# Patient Record
Sex: MALE | Race: WHITE | NOT HISPANIC OR LATINO | Employment: OTHER | URBAN - METROPOLITAN AREA
[De-identification: names, ages, dates, MRNs, and addresses within clinical notes are randomized per-mention and may not be internally consistent; named-entity substitution may affect disease eponyms.]

---

## 2017-02-17 ENCOUNTER — APPOINTMENT (OUTPATIENT)
Dept: CARDIOLOGY | Facility: HOSPITAL | Age: 77
End: 2017-02-17
Attending: INTERNAL MEDICINE

## 2017-02-17 ENCOUNTER — TRANSCRIBE ORDERS (OUTPATIENT)
Dept: ADMINISTRATIVE | Facility: HOSPITAL | Age: 77
End: 2017-02-17

## 2017-02-17 DIAGNOSIS — M79.604 PAIN OF RIGHT LOWER EXTREMITY: Primary | ICD-10-CM

## 2017-02-20 ENCOUNTER — HOSPITAL ENCOUNTER (OUTPATIENT)
Dept: CARDIOLOGY | Facility: HOSPITAL | Age: 77
Discharge: HOME | End: 2017-02-20
Attending: INTERNAL MEDICINE | Admitting: INTERNAL MEDICINE

## 2017-02-20 ENCOUNTER — TRANSCRIBE ORDERS (OUTPATIENT)
Dept: ADMINISTRATIVE | Facility: HOSPITAL | Age: 77
End: 2017-02-20

## 2017-02-20 ENCOUNTER — HOSPITAL ENCOUNTER (OUTPATIENT)
Dept: GENERAL RADIOLOGY | Facility: HOSPITAL | Age: 77
Discharge: HOME | End: 2017-02-20
Attending: INTERNAL MEDICINE

## 2017-02-20 DIAGNOSIS — M79.604 PAIN OF RIGHT LOWER EXTREMITY: ICD-10-CM

## 2017-02-20 DIAGNOSIS — M79.604 PAIN OF RIGHT LOWER EXTREMITY: Primary | ICD-10-CM

## 2017-02-20 LAB
BH CV LOW VAS RIGHT DISTAL FEMORAL SPONT: 1
BH CV LOW VAS RIGHT LESSER SAPH VESSEL: 1
BH CV LOW VAS RIGHT MID FEMORAL SPONT: 1
BH CV LOW VAS RIGHT POPLITEAL SPONT: 1
BH CV LOW VAS RIGHT POSTERIOR TIBIAL VESSEL: 1
BH CV LOWER VASCULAR LEFT COMMON FEMORAL AUGMENT: NORMAL
BH CV LOWER VASCULAR LEFT COMMON FEMORAL COMPETENT: NORMAL
BH CV LOWER VASCULAR LEFT COMMON FEMORAL COMPRESS: NORMAL
BH CV LOWER VASCULAR LEFT COMMON FEMORAL PHASIC: NORMAL
BH CV LOWER VASCULAR LEFT COMMON FEMORAL SPONT: NORMAL
BH CV LOWER VASCULAR RIGHT COMMON FEMORAL AUGMENT: NORMAL
BH CV LOWER VASCULAR RIGHT COMMON FEMORAL COMPETENT: NORMAL
BH CV LOWER VASCULAR RIGHT COMMON FEMORAL COMPRESS: NORMAL
BH CV LOWER VASCULAR RIGHT COMMON FEMORAL PHASIC: NORMAL
BH CV LOWER VASCULAR RIGHT COMMON FEMORAL SPONT: NORMAL
BH CV LOWER VASCULAR RIGHT DISTAL FEMORAL COMPRESS: NORMAL
BH CV LOWER VASCULAR RIGHT DISTAL FEMORAL THROMBUS: NORMAL
BH CV LOWER VASCULAR RIGHT GASTRONEMIUS COMPRESS: NORMAL
BH CV LOWER VASCULAR RIGHT GREATER SAPH AK COMPRESS: NORMAL
BH CV LOWER VASCULAR RIGHT GREATER SAPH BK COMPRESS: NORMAL
BH CV LOWER VASCULAR RIGHT LESSER SAPH COMPRESS: NORMAL
BH CV LOWER VASCULAR RIGHT LESSER SAPH THROMBUS: NORMAL
BH CV LOWER VASCULAR RIGHT MID FEMORAL AUGMENT: NORMAL
BH CV LOWER VASCULAR RIGHT MID FEMORAL COMPETENT: NORMAL
BH CV LOWER VASCULAR RIGHT MID FEMORAL COMPRESS: NORMAL
BH CV LOWER VASCULAR RIGHT MID FEMORAL PHASIC: NORMAL
BH CV LOWER VASCULAR RIGHT MID FEMORAL SPONT: NORMAL
BH CV LOWER VASCULAR RIGHT MID FEMORAL THROMBUS: NORMAL
BH CV LOWER VASCULAR RIGHT PERONEAL COMPRESS: NORMAL
BH CV LOWER VASCULAR RIGHT POPLITEAL AUGMENT: NORMAL
BH CV LOWER VASCULAR RIGHT POPLITEAL COMPETENT: NORMAL
BH CV LOWER VASCULAR RIGHT POPLITEAL COMPRESS: NORMAL
BH CV LOWER VASCULAR RIGHT POPLITEAL PHASIC: NORMAL
BH CV LOWER VASCULAR RIGHT POPLITEAL SPONT: NORMAL
BH CV LOWER VASCULAR RIGHT POPLITEAL THROMBUS: NORMAL
BH CV LOWER VASCULAR RIGHT POSTERIOR TIBIAL COMPRESS: NORMAL
BH CV LOWER VASCULAR RIGHT POSTERIOR TIBIAL THROMBUS: NORMAL
BH CV LOWER VASCULAR RIGHT PROXIMAL FEMORAL COMPRESS: NORMAL
BH CV LOWER VASCULAR RIGHT SAPHENOFEMORAL JUNCTION AUGMENT: NORMAL
BH CV LOWER VASCULAR RIGHT SAPHENOFEMORAL JUNCTION COMPETENT: NORMAL
BH CV LOWER VASCULAR RIGHT SAPHENOFEMORAL JUNCTION COMPRESS: NORMAL
BH CV LOWER VASCULAR RIGHT SAPHENOFEMORAL JUNCTION PHASIC: NORMAL
BH CV LOWER VASCULAR RIGHT SAPHENOFEMORAL JUNCTION SPONT: NORMAL

## 2017-02-20 PROCEDURE — 73560 X-RAY EXAM OF KNEE 1 OR 2: CPT

## 2017-02-20 PROCEDURE — 93971 EXTREMITY STUDY: CPT

## 2018-11-12 ENCOUNTER — LAB (OUTPATIENT)
Dept: LAB | Facility: HOSPITAL | Age: 78
End: 2018-11-12
Attending: INTERNAL MEDICINE

## 2018-11-12 ENCOUNTER — HOSPITAL ENCOUNTER (OUTPATIENT)
Dept: CARDIOLOGY | Facility: HOSPITAL | Age: 78
Discharge: HOME | End: 2018-11-12
Attending: INTERNAL MEDICINE | Admitting: INTERNAL MEDICINE

## 2018-11-12 ENCOUNTER — TRANSCRIBE ORDERS (OUTPATIENT)
Dept: ADMINISTRATIVE | Facility: HOSPITAL | Age: 78
End: 2018-11-12

## 2018-11-12 DIAGNOSIS — M79.602 PAIN AND SWELLING OF UPPER EXTREMITY, LEFT: Primary | ICD-10-CM

## 2018-11-12 DIAGNOSIS — I68.0: ICD-10-CM

## 2018-11-12 DIAGNOSIS — M79.89 PAIN AND SWELLING OF UPPER EXTREMITY, LEFT: ICD-10-CM

## 2018-11-12 DIAGNOSIS — M79.602 PAIN AND SWELLING OF UPPER EXTREMITY, LEFT: ICD-10-CM

## 2018-11-12 DIAGNOSIS — E85.4: ICD-10-CM

## 2018-11-12 DIAGNOSIS — M79.89 PAIN AND SWELLING OF UPPER EXTREMITY, LEFT: Primary | ICD-10-CM

## 2018-11-12 LAB
ALBUMIN SERPL-MCNC: 4.3 G/DL (ref 3.5–5.2)
ALBUMIN/GLOB SERPL: 1.4 G/DL
ALP SERPL-CCNC: 60 U/L (ref 39–117)
ALT SERPL W P-5'-P-CCNC: 17 U/L (ref 1–41)
ANION GAP SERPL CALCULATED.3IONS-SCNC: 10.8 MMOL/L
APTT PPP: 27.1 SECONDS (ref 22.7–35.4)
AST SERPL-CCNC: 18 U/L (ref 1–40)
BASOPHILS # BLD AUTO: 0.02 10*3/MM3 (ref 0–0.2)
BASOPHILS NFR BLD AUTO: 0.3 % (ref 0–1.5)
BH CV UPPER VENOUS LEFT AXILLARY AUGMENT: NORMAL
BH CV UPPER VENOUS LEFT AXILLARY COLOR: 1
BH CV UPPER VENOUS LEFT AXILLARY COMPETENT: NORMAL
BH CV UPPER VENOUS LEFT AXILLARY COMPRESS: NORMAL
BH CV UPPER VENOUS LEFT AXILLARY PHASIC: NORMAL
BH CV UPPER VENOUS LEFT AXILLARY SPONT: NORMAL
BH CV UPPER VENOUS LEFT AXILLARY THROMBUS: NORMAL
BH CV UPPER VENOUS LEFT BASILIC FOREARM COLOR: 1
BH CV UPPER VENOUS LEFT BASILIC FOREARM COMPRESS: NORMAL
BH CV UPPER VENOUS LEFT BASILIC FOREARM THROMBUS: NORMAL
BH CV UPPER VENOUS LEFT BASILIC UPPER COLOR: 1
BH CV UPPER VENOUS LEFT BASILIC UPPER COMPRESS: NORMAL
BH CV UPPER VENOUS LEFT BASILIC UPPER THROMBUS: NORMAL
BH CV UPPER VENOUS LEFT BRACHIAL COMPRESS: NORMAL
BH CV UPPER VENOUS LEFT CEPHALIC FOREARM COLOR: 1
BH CV UPPER VENOUS LEFT CEPHALIC FOREARM COMPRESS: NORMAL
BH CV UPPER VENOUS LEFT CEPHALIC FOREARM THROMBUS: NORMAL
BH CV UPPER VENOUS LEFT CEPHALIC UPPER COLOR: 1
BH CV UPPER VENOUS LEFT CEPHALIC UPPER COMPRESS: NORMAL
BH CV UPPER VENOUS LEFT CEPHALIC UPPER THROMBUS: NORMAL
BH CV UPPER VENOUS LEFT INTERNAL JUGULAR AUGMENT: NORMAL
BH CV UPPER VENOUS LEFT INTERNAL JUGULAR COMPETENT: NORMAL
BH CV UPPER VENOUS LEFT INTERNAL JUGULAR COMPRESS: NORMAL
BH CV UPPER VENOUS LEFT INTERNAL JUGULAR PHASIC: NORMAL
BH CV UPPER VENOUS LEFT INTERNAL JUGULAR SPONT: NORMAL
BH CV UPPER VENOUS LEFT RADIAL COMPRESS: NORMAL
BH CV UPPER VENOUS LEFT SUBCLAVIAN AUGMENT: NORMAL
BH CV UPPER VENOUS LEFT SUBCLAVIAN COLOR: 1
BH CV UPPER VENOUS LEFT SUBCLAVIAN COMPETENT: NORMAL
BH CV UPPER VENOUS LEFT SUBCLAVIAN COMPRESS: NORMAL
BH CV UPPER VENOUS LEFT SUBCLAVIAN PHASIC: NORMAL
BH CV UPPER VENOUS LEFT SUBCLAVIAN SPONT: NORMAL
BH CV UPPER VENOUS LEFT SUBCLAVIAN THROMBUS: NORMAL
BH CV UPPER VENOUS LEFT ULNAR COMPRESS: NORMAL
BH CV UPPER VENOUS RIGHT INTERNAL JUGULAR AUGMENT: NORMAL
BH CV UPPER VENOUS RIGHT INTERNAL JUGULAR COMPETENT: NORMAL
BH CV UPPER VENOUS RIGHT INTERNAL JUGULAR COMPRESS: NORMAL
BH CV UPPER VENOUS RIGHT INTERNAL JUGULAR PHASIC: NORMAL
BH CV UPPER VENOUS RIGHT INTERNAL JUGULAR SPONT: NORMAL
BH CV UPPER VENOUS RIGHT SUBCLAVIAN AUGMENT: NORMAL
BH CV UPPER VENOUS RIGHT SUBCLAVIAN COMPETENT: NORMAL
BH CV UPPER VENOUS RIGHT SUBCLAVIAN COMPRESS: NORMAL
BH CV UPPER VENOUS RIGHT SUBCLAVIAN PHASIC: NORMAL
BH CV UPPER VENOUS RIGHT SUBCLAVIAN SPONT: NORMAL
BILIRUB SERPL-MCNC: 0.5 MG/DL (ref 0.1–1.2)
BUN BLD-MCNC: 19 MG/DL (ref 8–23)
BUN/CREAT SERPL: 16.2 (ref 7–25)
CALCIUM SPEC-SCNC: 9.7 MG/DL (ref 8.6–10.5)
CHLORIDE SERPL-SCNC: 106 MMOL/L (ref 98–107)
CO2 SERPL-SCNC: 25.2 MMOL/L (ref 22–29)
CREAT BLD-MCNC: 1.17 MG/DL (ref 0.76–1.27)
DEPRECATED RDW RBC AUTO: 48.4 FL (ref 37–54)
EOSINOPHIL # BLD AUTO: 0.07 10*3/MM3 (ref 0–0.7)
EOSINOPHIL NFR BLD AUTO: 1.2 % (ref 0.3–6.2)
ERYTHROCYTE [DISTWIDTH] IN BLOOD BY AUTOMATED COUNT: 13.6 % (ref 11.5–14.5)
F5 GENE MUT ANL BLD/T: NORMAL
FACTOR II, DNA ANALYSIS: NORMAL
GFR SERPL CREATININE-BSD FRML MDRD: 60 ML/MIN/1.73
GLOBULIN UR ELPH-MCNC: 3 GM/DL
GLUCOSE BLD-MCNC: 100 MG/DL (ref 65–99)
HCT VFR BLD AUTO: 41.6 % (ref 40.4–52.2)
HGB BLD-MCNC: 14.1 G/DL (ref 13.7–17.6)
IMM GRANULOCYTES # BLD: 0.02 10*3/MM3 (ref 0–0.03)
IMM GRANULOCYTES NFR BLD: 0.3 % (ref 0–0.5)
INR PPP: 1.09 (ref 0.9–1.1)
LYMPHOCYTES # BLD AUTO: 1.47 10*3/MM3 (ref 0.9–4.8)
LYMPHOCYTES NFR BLD AUTO: 25.5 % (ref 19.6–45.3)
MCH RBC QN AUTO: 32.9 PG (ref 27–32.7)
MCHC RBC AUTO-ENTMCNC: 33.9 G/DL (ref 32.6–36.4)
MCV RBC AUTO: 97.2 FL (ref 79.8–96.2)
MONOCYTES # BLD AUTO: 0.39 10*3/MM3 (ref 0.2–1.2)
MONOCYTES NFR BLD AUTO: 6.8 % (ref 5–12)
NEUTROPHILS # BLD AUTO: 3.82 10*3/MM3 (ref 1.9–8.1)
NEUTROPHILS NFR BLD AUTO: 66.2 % (ref 42.7–76)
PLATELET # BLD AUTO: 164 10*3/MM3 (ref 140–500)
PMV BLD AUTO: 10.6 FL (ref 6–12)
POTASSIUM BLD-SCNC: 4.4 MMOL/L (ref 3.5–5.2)
PROT SERPL-MCNC: 7.3 G/DL (ref 6–8.5)
PROTHROMBIN TIME: 13.9 SECONDS (ref 11.7–14.2)
RBC # BLD AUTO: 4.28 10*6/MM3 (ref 4.6–6)
SODIUM BLD-SCNC: 142 MMOL/L (ref 136–145)
WBC NRBC COR # BLD: 5.77 10*3/MM3 (ref 4.5–10.7)

## 2018-11-12 PROCEDURE — 80053 COMPREHEN METABOLIC PANEL: CPT

## 2018-11-12 PROCEDURE — 85306 CLOT INHIBIT PROT S FREE: CPT

## 2018-11-12 PROCEDURE — 93971 EXTREMITY STUDY: CPT

## 2018-11-12 PROCEDURE — 85303 CLOT INHIBIT PROT C ACTIVITY: CPT

## 2018-11-12 PROCEDURE — 81240 F2 GENE: CPT

## 2018-11-12 PROCEDURE — 85610 PROTHROMBIN TIME: CPT

## 2018-11-12 PROCEDURE — 81241 F5 GENE: CPT

## 2018-11-12 PROCEDURE — 36415 COLL VENOUS BLD VENIPUNCTURE: CPT

## 2018-11-12 PROCEDURE — 85300 ANTITHROMBIN III ACTIVITY: CPT

## 2018-11-12 PROCEDURE — 85025 COMPLETE CBC W/AUTO DIFF WBC: CPT

## 2018-11-12 PROCEDURE — 85730 THROMBOPLASTIN TIME PARTIAL: CPT

## 2018-11-12 NOTE — PROGRESS NOTES
Left upper extremity venous doppler completed, Prelim positive for acute DVT given to Dr Smyth. Pt sent back to Dr isbell.

## 2018-11-13 ENCOUNTER — HOSPITAL ENCOUNTER (OUTPATIENT)
Dept: GENERAL RADIOLOGY | Facility: HOSPITAL | Age: 78
Discharge: HOME | End: 2018-11-13
Attending: INTERNAL MEDICINE | Admitting: INTERNAL MEDICINE

## 2018-11-13 DIAGNOSIS — I80.8 THROMBOPHLEBITIS ARM: ICD-10-CM

## 2018-11-13 LAB
AT III PPP CHRO-ACNC: 95 % (ref 90–134)
PROT C ACT/NOR PPP: 105 % (ref 86–163)
PROT S ACT/NOR PPP: 110 % (ref 70–127)
PROT S FREE PPP-ACNC: 94 % (ref 49–138)

## 2018-11-13 PROCEDURE — 71046 X-RAY EXAM CHEST 2 VIEWS: CPT

## 2020-07-16 ENCOUNTER — OFFICE VISIT (OUTPATIENT)
Dept: GASTROENTEROLOGY | Facility: CLINIC | Age: 80
End: 2020-07-16

## 2020-07-16 VITALS — TEMPERATURE: 97.8 F | BODY MASS INDEX: 26.63 KG/M2 | WEIGHT: 196.6 LBS | HEIGHT: 72 IN

## 2020-07-16 DIAGNOSIS — Z86.010 HISTORY OF ADENOMATOUS POLYP OF COLON: ICD-10-CM

## 2020-07-16 DIAGNOSIS — R19.5 OCCULT BLOOD IN STOOLS: Primary | ICD-10-CM

## 2020-07-16 PROBLEM — Z86.0101 HISTORY OF ADENOMATOUS POLYP OF COLON: Status: ACTIVE | Noted: 2020-07-16

## 2020-07-16 PROCEDURE — 99203 OFFICE O/P NEW LOW 30 MIN: CPT | Performed by: INTERNAL MEDICINE

## 2020-07-16 RX ORDER — RIVAROXABAN 10 MG/1
10 TABLET, FILM COATED ORAL DAILY
COMMUNITY
Start: 2020-05-05

## 2020-07-16 RX ORDER — UBIDECARENONE 75 MG
50 CAPSULE ORAL DAILY
COMMUNITY

## 2020-07-16 RX ORDER — MULTIVIT WITH MINERALS/LUTEIN
250 TABLET ORAL DAILY
COMMUNITY

## 2020-07-16 NOTE — PROGRESS NOTES
Chief Complaint   Patient presents with   • Positive hemoccult     Jayme Zaragoza is a 80 y.o. male who presents with a occult blood in the stool  HPI     Patient 80-year-old male with history of osteoarthritis presenting after occult blood found in the stool.  Patient does have a history of DVT currently on Xarelto doing well with no complaints.  Patient denies change in bowel habits no nausea vomiting no melena or bright red blood per rectum.  Patient on routine physical underwent rectal exam which tested positive for blood.  Labs at that time were normal now here for further recommendations.    Past Medical History:   Diagnosis Date   • Arthritis    • Cancer (CMS/HCC)    • History of transfusion        Current Outpatient Medications:   •  esomeprazole (NexIUM) 20 MG capsule, Take 20 mg by mouth every morning before breakfast., Disp: , Rfl:   •  Multiple Vitamins-Minerals (CENTRUM SILVER ADULT 50+ PO), Take 1 tablet by mouth daily., Disp: , Rfl:   •  sertraline (ZOLOFT) 25 MG tablet, Take 25 mg by mouth daily., Disp: , Rfl:   •  simvastatin (ZOCOR) 10 MG tablet, Take 10 mg by mouth every night., Disp: , Rfl:   •  vitamin B-12 (CYANOCOBALAMIN) 100 MCG tablet, Take 50 mcg by mouth Daily., Disp: , Rfl:   •  vitamin C (ASCORBIC ACID) 250 MG tablet, Take 250 mg by mouth Daily., Disp: , Rfl:   •  XARELTO 10 MG tablet, Take 10 mg by mouth Daily., Disp: , Rfl:   No Known Allergies  Social History     Socioeconomic History   • Marital status:      Spouse name: Not on file   • Number of children: Not on file   • Years of education: Not on file   • Highest education level: Not on file   Tobacco Use   • Smoking status: Former Smoker     Types: Cigars     Last attempt to quit: 1960     Years since quittin.2   • Smokeless tobacco: Never Used   Substance and Sexual Activity   • Alcohol use: Yes     Comment: 2-3 glasses of wine per day    • Drug use: No   • Sexual activity: Yes     Family History   Problem  Relation Age of Onset   • Arthritis Mother    • Cancer Mother    • Alcohol abuse Brother    • Depression Brother    • Diabetes Brother    • Hypertension Brother      Review of Systems   Constitutional: Negative.    HENT: Negative.    Eyes: Negative.    Respiratory: Negative.    Cardiovascular: Negative.    Gastrointestinal: Negative.    Endocrine: Negative.    Musculoskeletal: Positive for arthralgias. Negative for back pain, gait problem and joint swelling.   Skin: Negative.    Allergic/Immunologic: Negative.    Hematological: Negative.      Vitals:    07/16/20 1441   Temp: 97.8 °F (36.6 °C)     Physical Exam   Constitutional: He is oriented to person, place, and time. He appears well-developed and well-nourished.   HENT:   Head: Normocephalic and atraumatic.   Eyes: Pupils are equal, round, and reactive to light. Conjunctivae and EOM are normal. No scleral icterus.   Neck: Normal range of motion. No tracheal deviation present.   Cardiovascular: Normal rate and regular rhythm. Exam reveals no gallop and no friction rub.   No murmur heard.  Pulmonary/Chest: Effort normal and breath sounds normal. No respiratory distress. He has no wheezes. He has no rales.   Abdominal: Soft. Bowel sounds are normal. He exhibits no distension and no mass. There is no tenderness. There is no rebound and no guarding.   Musculoskeletal: Normal range of motion. He exhibits no edema.   Neurological: He is alert and oriented to person, place, and time. No cranial nerve deficit.   Skin: Skin is warm and dry. No rash noted.   Psychiatric: He has a normal mood and affect. Judgment normal.   Nursing note and vitals reviewed.    Diagnoses and all orders for this visit:    Occult blood in stools  -     Case Request; Standing  -     Implement Anesthesia orders day of procedure.; Standing  -     Obtain informed consent; Standing  -     Case Request    History of adenomatous polyp of colon  -     Case Request; Standing  -     Implement Anesthesia  orders day of procedure.; Standing  -     Obtain informed consent; Standing  -     Case Request    Other orders  -     XARELTO 10 MG tablet; Take 10 mg by mouth Daily.  -     vitamin B-12 (CYANOCOBALAMIN) 100 MCG tablet; Take 50 mcg by mouth Daily.  -     vitamin C (ASCORBIC ACID) 250 MG tablet; Take 250 mg by mouth Daily.    Patient 80-year-old male with past medical history significant for osteoarthritis presenting with a cold blood in the stool.  Patient last colonoscopy May 2016 has been doing well with no complaints.  Patient was noted on routine exam to be occult blood positive.  Labs have been otherwise normal here for further recommendations.  At this point would recommend with positive occult stools to proceed with colonoscopy now for over 4 years since last scope.  If negative okay to wait 5 to 6 years for next endoscopy.  We will follow-up clinically based on findings on endoscopy.

## 2020-07-22 ENCOUNTER — TRANSCRIBE ORDERS (OUTPATIENT)
Dept: SLEEP MEDICINE | Facility: HOSPITAL | Age: 80
End: 2020-07-22

## 2020-07-22 DIAGNOSIS — Z01.818 OTHER SPECIFIED PRE-OPERATIVE EXAMINATION: Primary | ICD-10-CM

## 2020-07-27 ENCOUNTER — LAB (OUTPATIENT)
Dept: LAB | Facility: HOSPITAL | Age: 80
End: 2020-07-27

## 2020-07-27 DIAGNOSIS — Z01.818 OTHER SPECIFIED PRE-OPERATIVE EXAMINATION: ICD-10-CM

## 2020-07-27 PROCEDURE — C9803 HOPD COVID-19 SPEC COLLECT: HCPCS

## 2020-07-27 PROCEDURE — U0004 COV-19 TEST NON-CDC HGH THRU: HCPCS

## 2020-07-28 LAB
REF LAB TEST METHOD: NORMAL
SARS-COV-2 RNA RESP QL NAA+PROBE: NOT DETECTED

## 2020-07-29 ENCOUNTER — HOSPITAL ENCOUNTER (OUTPATIENT)
Facility: HOSPITAL | Age: 80
Setting detail: HOSPITAL OUTPATIENT SURGERY
Discharge: HOME | End: 2020-07-29
Attending: INTERNAL MEDICINE | Admitting: INTERNAL MEDICINE

## 2020-07-29 ENCOUNTER — ANESTHESIA (OUTPATIENT)
Dept: GASTROENTEROLOGY | Facility: HOSPITAL | Age: 80
End: 2020-07-29

## 2020-07-29 ENCOUNTER — ANESTHESIA EVENT (OUTPATIENT)
Dept: GASTROENTEROLOGY | Facility: HOSPITAL | Age: 80
End: 2020-07-29

## 2020-07-29 VITALS
RESPIRATION RATE: 16 BRPM | TEMPERATURE: 98.1 F | HEIGHT: 73 IN | HEART RATE: 74 BPM | DIASTOLIC BLOOD PRESSURE: 84 MMHG | WEIGHT: 191 LBS | SYSTOLIC BLOOD PRESSURE: 128 MMHG | BODY MASS INDEX: 25.31 KG/M2 | OXYGEN SATURATION: 100 %

## 2020-07-29 DIAGNOSIS — R19.5 OCCULT BLOOD IN STOOLS: ICD-10-CM

## 2020-07-29 DIAGNOSIS — Z86.010 HISTORY OF ADENOMATOUS POLYP OF COLON: ICD-10-CM

## 2020-07-29 PROCEDURE — 88305 TISSUE EXAM BY PATHOLOGIST: CPT | Performed by: INTERNAL MEDICINE

## 2020-07-29 PROCEDURE — 25010000002 PROPOFOL 10 MG/ML EMULSION: Performed by: NURSE ANESTHETIST, CERTIFIED REGISTERED

## 2020-07-29 PROCEDURE — 45385 COLONOSCOPY W/LESION REMOVAL: CPT | Performed by: INTERNAL MEDICINE

## 2020-07-29 PROCEDURE — 25010000002 PHENYLEPHRINE PER 1 ML: Performed by: NURSE ANESTHETIST, CERTIFIED REGISTERED

## 2020-07-29 RX ORDER — PROPOFOL 10 MG/ML
VIAL (ML) INTRAVENOUS CONTINUOUS PRN
Status: DISCONTINUED | OUTPATIENT
Start: 2020-07-29 | End: 2020-07-29 | Stop reason: SURG

## 2020-07-29 RX ORDER — SODIUM CHLORIDE, SODIUM LACTATE, POTASSIUM CHLORIDE, CALCIUM CHLORIDE 600; 310; 30; 20 MG/100ML; MG/100ML; MG/100ML; MG/100ML
INJECTION, SOLUTION INTRAVENOUS CONTINUOUS PRN
Status: DISCONTINUED | OUTPATIENT
Start: 2020-07-29 | End: 2020-07-29 | Stop reason: SURG

## 2020-07-29 RX ORDER — GLYCOPYRROLATE 0.2 MG/ML
INJECTION INTRAMUSCULAR; INTRAVENOUS AS NEEDED
Status: DISCONTINUED | OUTPATIENT
Start: 2020-07-29 | End: 2020-07-29 | Stop reason: SURG

## 2020-07-29 RX ORDER — LIDOCAINE HYDROCHLORIDE 20 MG/ML
INJECTION, SOLUTION INFILTRATION; PERINEURAL AS NEEDED
Status: DISCONTINUED | OUTPATIENT
Start: 2020-07-29 | End: 2020-07-29 | Stop reason: SURG

## 2020-07-29 RX ORDER — SODIUM CHLORIDE, SODIUM LACTATE, POTASSIUM CHLORIDE, CALCIUM CHLORIDE 600; 310; 30; 20 MG/100ML; MG/100ML; MG/100ML; MG/100ML
30 INJECTION, SOLUTION INTRAVENOUS CONTINUOUS PRN
Status: DISCONTINUED | OUTPATIENT
Start: 2020-07-29 | End: 2020-07-29 | Stop reason: HOSPADM

## 2020-07-29 RX ORDER — PROPOFOL 10 MG/ML
VIAL (ML) INTRAVENOUS AS NEEDED
Status: DISCONTINUED | OUTPATIENT
Start: 2020-07-29 | End: 2020-07-29 | Stop reason: SURG

## 2020-07-29 RX ADMIN — PROPOFOL 70 MG: 10 INJECTION, EMULSION INTRAVENOUS at 10:18

## 2020-07-29 RX ADMIN — SODIUM CHLORIDE, POTASSIUM CHLORIDE, SODIUM LACTATE AND CALCIUM CHLORIDE: 600; 310; 30; 20 INJECTION, SOLUTION INTRAVENOUS at 10:15

## 2020-07-29 RX ADMIN — LIDOCAINE HYDROCHLORIDE 60 MG: 20 INJECTION, SOLUTION INFILTRATION; PERINEURAL at 10:18

## 2020-07-29 RX ADMIN — GLYCOPYRROLATE 200 MCG: 0.2 INJECTION INTRAMUSCULAR; INTRAVENOUS at 10:41

## 2020-07-29 RX ADMIN — PHENYLEPHRINE HYDROCHLORIDE 100 MCG: 10 INJECTION INTRAVENOUS at 10:38

## 2020-07-29 RX ADMIN — PROPOFOL 180 MCG/KG/MIN: 10 INJECTION, EMULSION INTRAVENOUS at 10:18

## 2020-07-29 RX ADMIN — SODIUM CHLORIDE, POTASSIUM CHLORIDE, SODIUM LACTATE AND CALCIUM CHLORIDE 30 ML/HR: 600; 310; 30; 20 INJECTION, SOLUTION INTRAVENOUS at 10:11

## 2020-07-29 NOTE — ANESTHESIA POSTPROCEDURE EVALUATION
"Patient: Jayme Zaragoza    Procedure Summary     Date:  07/29/20 Room / Location:   [facility] /  [facility]    Anesthesia Start:  1015 Anesthesia Stop:  1046    Procedure:  COLONOSCOPY to cecum and TI with hot snare polypectomy (N/A ) Diagnosis:       Occult blood in stools      History of adenomatous polyp of colon      Colon polyps      Internal hemorrhoids      (Occult blood in stools [R19.5])      (History of adenomatous polyp of colon [Z86.010])    Surgeon:  Zach Lerner MD Provider:  Markie Dee MD    Anesthesia Type:  MAC ASA Status:  3          Anesthesia Type: MAC    Vitals  Vitals Value Taken Time   /82 7/29/2020 10:56 AM   Temp     Pulse 71 7/29/2020 10:56 AM   Resp 16 7/29/2020 10:56 AM   SpO2 97 % 7/29/2020 10:56 AM           Post Anesthesia Care and Evaluation    Patient location during evaluation: bedside  Patient participation: complete - patient participated  Level of consciousness: awake  Pain score: 2  Pain management: adequate  Airway patency: patent  Anesthetic complications: No anesthetic complications  PONV Status: none  Cardiovascular status: acceptable  Respiratory status: acceptable  Hydration status: acceptable    Comments: /82 (BP Location: Left arm, Patient Position: Lying)   Pulse 71   Temp 36.7 °C (98.1 °F) (Oral)   Resp 16   Ht 184.2 cm (72.5\")   Wt 86.6 kg (191 lb)   SpO2 97%   BMI 25.55 kg/m²         "

## 2020-07-29 NOTE — ANESTHESIA PREPROCEDURE EVALUATION
Anesthesia Evaluation     Patient summary reviewed and Nursing notes reviewed   NPO Solid Status: > 8 hours  NPO Liquid Status: > 2 hours           Airway   Mallampati: II  TM distance: >3 FB  Neck ROM: full  Dental - normal exam     Pulmonary - normal exam   (+) a smoker Former,   Cardiovascular - normal exam    (+) DVT,       Neuro/Psych- negative ROS  GI/Hepatic/Renal/Endo    (+)  GI bleeding lower ,     Musculoskeletal     Abdominal    Substance History - negative use     OB/GYN negative ob/gyn ROS         Other   arthritis,    history of cancer                    Anesthesia Plan    ASA 3     MAC       Anesthetic plan, all risks, benefits, and alternatives have been provided, discussed and informed consent has been obtained with: patient.

## 2020-07-30 LAB
CYTO UR: NORMAL
LAB AP CASE REPORT: NORMAL
PATH REPORT.FINAL DX SPEC: NORMAL
PATH REPORT.GROSS SPEC: NORMAL

## 2020-08-17 ENCOUNTER — TELEPHONE (OUTPATIENT)
Dept: GASTROENTEROLOGY | Facility: CLINIC | Age: 80
End: 2020-08-17

## 2020-08-17 NOTE — TELEPHONE ENCOUNTER
----- Message from Lynette Rodriguez sent at 8/17/2020  9:25 AM EDT -----  Contact: [phone]  Patient is asking for results.  Please call.

## 2020-08-21 ENCOUNTER — HOSPITAL ENCOUNTER (EMERGENCY)
Facility: HOSPITAL | Age: 80
Discharge: HOME | End: 2020-08-21
Attending: EMERGENCY MEDICINE | Admitting: EMERGENCY MEDICINE

## 2020-08-21 ENCOUNTER — APPOINTMENT (OUTPATIENT)
Dept: GENERAL RADIOLOGY | Facility: HOSPITAL | Age: 80
End: 2020-08-21

## 2020-08-21 ENCOUNTER — APPOINTMENT (OUTPATIENT)
Dept: ULTRASOUND IMAGING | Facility: HOSPITAL | Age: 80
End: 2020-08-21

## 2020-08-21 VITALS
TEMPERATURE: 97.6 F | OXYGEN SATURATION: 97 % | WEIGHT: 192 LBS | SYSTOLIC BLOOD PRESSURE: 150 MMHG | HEIGHT: 72 IN | BODY MASS INDEX: 26.01 KG/M2 | RESPIRATION RATE: 18 BRPM | HEART RATE: 97 BPM | DIASTOLIC BLOOD PRESSURE: 80 MMHG

## 2020-08-21 DIAGNOSIS — M54.41 ACUTE RIGHT-SIDED LOW BACK PAIN WITH RIGHT-SIDED SCIATICA: ICD-10-CM

## 2020-08-21 DIAGNOSIS — M19.90 ARTHRITIS: ICD-10-CM

## 2020-08-21 DIAGNOSIS — M47.9 SPONDYLOSIS: Primary | ICD-10-CM

## 2020-08-21 LAB
BILIRUB UR QL STRIP: NEGATIVE
CLARITY UR: CLEAR
COLOR UR: YELLOW
GLUCOSE UR STRIP-MCNC: NEGATIVE MG/DL
HGB UR QL STRIP.AUTO: NEGATIVE
KETONES UR QL STRIP: NEGATIVE
LEUKOCYTE ESTERASE UR QL STRIP.AUTO: NEGATIVE
NITRITE UR QL STRIP: NEGATIVE
PH UR STRIP.AUTO: 6 [PH] (ref 5–8)
PROT UR QL STRIP: NEGATIVE
SP GR UR STRIP: 1.02 (ref 1–1.03)
UROBILINOGEN UR QL STRIP: NORMAL

## 2020-08-21 PROCEDURE — 72110 X-RAY EXAM L-2 SPINE 4/>VWS: CPT

## 2020-08-21 PROCEDURE — 99283 EMERGENCY DEPT VISIT LOW MDM: CPT

## 2020-08-21 PROCEDURE — 81003 URINALYSIS AUTO W/O SCOPE: CPT | Performed by: PHYSICIAN ASSISTANT

## 2020-08-21 PROCEDURE — 93976 VASCULAR STUDY: CPT

## 2020-08-21 PROCEDURE — 76870 US EXAM SCROTUM: CPT

## 2020-08-21 RX ORDER — METHYLPREDNISOLONE 4 MG/1
TABLET ORAL
Qty: 21 TABLET | Refills: 0 | Status: SHIPPED | OUTPATIENT
Start: 2020-08-21 | End: 2020-09-29

## 2020-08-21 RX ORDER — HYDROCODONE BITARTRATE AND ACETAMINOPHEN 5; 325 MG/1; MG/1
1 TABLET ORAL EVERY 6 HOURS PRN
Status: DISCONTINUED | OUTPATIENT
Start: 2020-08-21 | End: 2020-08-21 | Stop reason: HOSPADM

## 2020-08-21 RX ORDER — CYCLOBENZAPRINE HCL 10 MG
10 TABLET ORAL ONCE
Status: COMPLETED | OUTPATIENT
Start: 2020-08-21 | End: 2020-08-21

## 2020-08-21 RX ADMIN — HYDROCODONE BITARTRATE AND ACETAMINOPHEN 1 TABLET: 5; 325 TABLET ORAL at 10:59

## 2020-08-21 RX ADMIN — CYCLOBENZAPRINE 10 MG: 10 TABLET, FILM COATED ORAL at 11:00

## 2020-08-21 NOTE — ED NOTES
Pt comes to the ED c/o pain in his lower back that radiates through his right flank towards his right testicle. Pt also complains of swelling to his right testicle. Pt denies numbness or tingling to his lower extremities. Pt reports history of DVT in the right lower extremity. Pt reports that he took a hydrocodone last evening to help with the pain.      Arnoldo Whitehead RN  08/21/20 1034       Arnoldo Whitehead RN  08/21/20 1037

## 2020-08-21 NOTE — ED PROVIDER NOTES
EMERGENCY DEPARTMENT ENCOUNTER    Room Number:  36/36  Date seen:  8/21/2020  Time seen: 10:36 AM  PCP: Srinivasan Smyth Jr., MD  Historian: Patient    HPI:  Chief complaint: Back pain  A complete HPI/ROS/PMH/PSH/SH/FH are unobtainable due to: None  Context:Jayme Zaragoza is a 80 y.o. male, hx of DVT, takes Xarelto, who presents to the ED with c/o right low back pain with intermittent sciatica down his right lower extremity and radiation to his right testicle since yesterday afternoon.  Patient denies any recent injury to the area.  Denies urinary or bowel incontinence, hematuria, dysuria, urinary frequency or urgency.  Took 2 extra strength Tylenol at 3 AM with some relief.    In addition, patient is complaining of right testicular pain and swelling since yesterday.    Patient was placed in face mask in first look. Patient was wearing facemask when I entered the room and throughout our encounter. I wore full protective equipment throughout this patient encounter including a face mask, and gloves. Hand hygiene was performed before donning protective equipment and after removal when leaving the room.    MEDICAL RECORD REVIEW  Patient was last seen here May 2016 for right lower extremity DVT.    ALLERGIES  Patient has no known allergies.    PAST MEDICAL HISTORY  Active Ambulatory Problems     Diagnosis Date Noted   • DVT (deep venous thrombosis) (CMS/HCC) 05/16/2016   • Occult blood in stools 07/16/2020   • History of adenomatous polyp of colon 07/16/2020     Resolved Ambulatory Problems     Diagnosis Date Noted   • No Resolved Ambulatory Problems     Past Medical History:   Diagnosis Date   • Anxiety    • Arthritis    • Cancer (CMS/HCC)    • History of transfusion    • Hyperlipidemia        PAST SURGICAL HISTORY  Past Surgical History:   Procedure Laterality Date   • APPENDECTOMY     • COLONOSCOPY  approx 2016    negative per pt    • COLONOSCOPY N/A 7/29/2020    Procedure: COLONOSCOPY to cecum and TI with hot  snare polypectomy;  Surgeon: Zach Lerner MD;  Location: [facility] END[facility];  Service: Gastroenterology;  Laterality: N/A;  pre: occult blood in stool  Post: polyp, hemorrhoids   • EYE SURGERY Bilateral     Cataracks   • SKIN BIOPSY     • SKIN BIOPSY     • TONSILLECTOMY         FAMILY HISTORY  Family History   Problem Relation Age of Onset   • Arthritis Mother    • Cancer Mother    • Alcohol abuse Brother    • Depression Brother    • Diabetes Brother    • Hypertension Brother        SOCIAL HISTORY  Social History     Socioeconomic History   • Marital status:      Spouse name: Not on file   • Number of children: Not on file   • Years of education: Not on file   • Highest education level: Not on file   Tobacco Use   • Smoking status: Former Smoker     Types: Cigars     Last attempt to quit: 1960     Years since quittin.3   • Smokeless tobacco: Never Used   Substance and Sexual Activity   • Alcohol use: Yes     Comment: 2-3 glasses of wine per day    • Drug use: No   • Sexual activity: Yes       REVIEW OF SYSTEMS  Review of Systems    All systems reviewed and negative except for those discussed in HPI.     PHYSICAL EXAM    ED Triage Vitals [20 0955]   Temp Heart Rate Resp BP SpO2   97.6 °F (36.4 °C) 97 18 -- 97 %      Temp src Heart Rate Source Patient Position BP Location FiO2 (%)   Tympanic Monitor -- -- --     Physical Exam    I have reviewed the triage vital signs and nursing notes.      GENERAL: not distressed  HENT: nares patent  EYES: no scleral icterus  NECK: no ROM limitations  CV: regular rhythm, regular rate  RESPIRATORY: normal effort; clear to auscultation bilaterally  : Mild swelling and tenderness to palpation to his right testicular area  MUSCULOSKELETAL: no deformity; tenderness to palpation to his right lumbar area, positive right straight leg raise test  NEURO: alert, moves all extremities, follows commands, no saddle anesthesia  SKIN: warm, dry    LAB RESULTS  Recent  Results (from the past 24 hour(s))   Urinalysis With Microscopic If Indicated (No Culture) - Urine, Clean Catch    Collection Time: 08/21/20 11:45 AM   Result Value Ref Range    Color, UA Yellow Yellow, Straw    Appearance, UA Clear Clear    pH, UA 6.0 5.0 - 8.0    Specific Gravity, UA 1.017 1.005 - 1.030    Glucose, UA Negative Negative    Ketones, UA Negative Negative    Bilirubin, UA Negative Negative    Blood, UA Negative Negative    Protein, UA Negative Negative    Leuk Esterase, UA Negative Negative    Nitrite, UA Negative Negative    Urobilinogen, UA 0.2 E.U./dL 0.2 - 1.0 E.U./dL         RADIOLOGY RESULTS  US Scrotum & Testicles   Final Result   No acute abnormality is seen. Please follow up as clinically   indicated.       This report was finalized on 8/21/2020 3:49 PM by Dr. Sudha Mcdonald M.D.          XR Spine Lumbar Complete 4+VW   Final Result      US Testicular or Ovarian Vascular Limited    (Results Pending)         PROGRESS, DATA ANALYSIS, CONSULTS AND MEDICAL DECISION MAKING  All labs have been independently reviewed by me.  All radiology studies have been reviewed by me and discussed with radiologist dictating the report. Discussion below represents my analysis of pertinent findings related to patient's condition, differential diagnosis, treatment plan and final disposition.     ED Course as of Aug 21 2102   Fri Aug 21, 2020   1108 Testicular exam chaperoned by BRETT Mclaughlin RN.    [SS]      ED Course User Index  [SS] Crystal Parada PA-C       The differential diagnosis include but are not limited to lumbar fracture, herniated disc, cauda equina syndrome, epididymitis.         Reviewed pt's history and workup with Dr. Escobedo.  After a bedside evaluation, Dr. Escobedo agrees with the plan of care.    (FOR DISCHARGE)The patient's history, physical exam, and lab findings were discussed with the physician, who also performed a face to face history and physical exam.  I discussed all results and noted any  "abnormalities with patient.  Discussed absoute need to recheck abnormalities with their family physician.  I answered any of the patient's questions.  Discussed plan for discharge, as there is no emergent indication for admission.  Pt is agreeable and understands need for follow up and repeat testing.  Pt is aware that discharge does not mean that nothing is wrong but it indicates no emergency is present and they must continue care with their family physician.  Pt is discharged with instructions to follow up with primary care doctor to have their blood pressure rechecked.         Disposition vitals:  /80 (BP Location: Left arm, Patient Position: Sitting)   Pulse 97   Temp 97.6 °F (36.4 °C) (Tympanic)   Resp 18   Ht 182.9 cm (72\")   Wt 87.1 kg (192 lb)   SpO2 97%   BMI 26.04 kg/m²       DIAGNOSIS  Final diagnoses:   Spondylosis   Arthritis   Acute right-sided low back pain with right-sided sciatica       FOLLOW UP   Srinivasan Smyth Jr., MD  [address] [facility] [address]  [place] [address]  [phone]          Jhony Norwood MD  [address] [facility] [address]  [place] [address]  [phone]    Call   If the back pain continues         Crystal Parada PA-C  08/21/20 2102    "

## 2020-08-21 NOTE — DISCHARGE INSTRUCTIONS
Please take prescription as prescribed.  Call Dr. Norwood, neurosurgery if the back pain continues.

## 2020-08-21 NOTE — ED PROVIDER NOTES
MD ATTESTATION NOTE    The RASHEEDA and I have discussed this patient's history, physical exam, and treatment plan.  I have reviewed the documentation and personally had a face to face interaction with the patient. I affirm the documentation and agree with the treatment and plan.  The attached note describes my personal findings.      History  80-year-old male presents with lower back pain rating to the right lower extremity.  No injury.  No significant weakness numbness or incontinence.  Patient is anticoagulated on Xarelto.    Physical Exam  Vital Signs reviewed  Alert, Well Appearing in NAD  Back-mild diffuse lumbar tenderness without focal point tenderness.  Neuro-strength sensation are grossly intact in bilateral lower extremities.    Disposition  I discussed evaluation treatment with JOHN Parada.  Testicular ultrasound done due to right testicular swelling was unremarkable.  Physical examination is unremarkable without significant red flags.  I do suspect mild degree of lumbar radiculopathy and expect conservative treatment.  We are still waiting at this time for results of plain films although I expect to find mild arthritic changes.     Pj Escobedo MD  08/21/20 4417

## 2020-08-21 NOTE — ED TRIAGE NOTES
Pt complains of low back pain that radiates down his right leg that started yesterday afternoon. Called his doctor and was told to come to the ER. Pt denies any known injury. Pt masked at arrival and triage staff wore all appropriate PPE.

## 2020-08-21 NOTE — ED NOTES
This RN in appropriate PPE for all patient care interactions. Pt masked and redirected for proper mask use when necessary. Hand hygiene performed before and after all patient care interactions.       Arnoldo Whitehead, RN  08/21/20 6835

## 2020-08-25 ENCOUNTER — TRANSCRIBE ORDERS (OUTPATIENT)
Dept: ADMINISTRATIVE | Facility: HOSPITAL | Age: 80
End: 2020-08-25

## 2020-08-25 DIAGNOSIS — M54.16 LUMBAR RADICULOPATHY: Primary | ICD-10-CM

## 2020-09-08 ENCOUNTER — HOSPITAL ENCOUNTER (OUTPATIENT)
Dept: MRI IMAGING | Facility: HOSPITAL | Age: 80
Discharge: HOME | End: 2020-09-08
Admitting: INTERNAL MEDICINE

## 2020-09-08 DIAGNOSIS — M54.16 LUMBAR RADICULOPATHY: ICD-10-CM

## 2020-09-08 LAB — CREAT BLDA-MCNC: 1.3 MG/DL (ref 0.6–1.3)

## 2020-09-08 PROCEDURE — 82565 ASSAY OF CREATININE: CPT

## 2020-09-08 PROCEDURE — 0 GADOBENATE DIMEGLUMINE 529 MG/ML SOLUTION: Performed by: INTERNAL MEDICINE

## 2020-09-08 PROCEDURE — A9577 INJ MULTIHANCE: HCPCS | Performed by: INTERNAL MEDICINE

## 2020-09-08 PROCEDURE — 72158 MRI LUMBAR SPINE W/O & W/DYE: CPT

## 2020-09-08 RX ADMIN — GADOBENATE DIMEGLUMINE 18 ML: 529 INJECTION, SOLUTION INTRAVENOUS at 10:32

## 2020-09-17 ENCOUNTER — TELEPHONE (OUTPATIENT)
Dept: GASTROENTEROLOGY | Facility: CLINIC | Age: 80
End: 2020-09-17

## 2020-09-17 NOTE — TELEPHONE ENCOUNTER
----- Message from Zach Lerner MD sent at 9/7/2020  4:01 PM EDT -----  Polyp benign, repeat colon 5 years as discussed

## 2020-09-21 NOTE — PROGRESS NOTES
Subjective   History of Present Illness: Jayme Zaragoza is a 80 y.o. male is being seen for consultation today at the request of Srinivasan Smyth Jr.,*. He reports that on August 8, 2020 he was playing golf and that is when he thinks he hurt his back.  Actually had pretty significant right-sided back pain into the right thigh and right groin.  Even sought care through the emergency room because of the pain.  He reports that within the last several day his back pain and right leg pain has improved and nearly resolved. He denies numbness, tingling and weakness. He doesn't have back pain. He has very little right leg pain. He was given oral steroids as primary physician.     In reference to an incidental finding seen on his lumbar MRI I inquired whether he ever had a spinal tap as a child and he does not think he ever did.  He is also never had any significant spinal or lower extremity issue and was an avid sportsman most of his life    While in the room and during my examination of the patient I wore a mask and eye protection.  I washed my hands before and after this patient encounter.  The patient was also wearing a mask.    Leg Pain   The pain is present in the right leg, right hip, right thigh, right knee, right ankle, right heel, right foot and right toes. Quality: soreness. The pain is at a severity of 1/10. The pain is mild. The pain has been intermittent since onset. Pertinent negatives include no muscle weakness, numbness or tingling. Treatments tried: Oral steroids.       The following portions of the patient's history were reviewed and updated as appropriate: allergies, current medications, past family history, past medical history, past social history, past surgical history and problem list.    Review of Systems   Constitutional: Negative.  Negative for activity change and fever.   HENT: Negative.  Negative for congestion.    Eyes: Negative.  Negative for visual disturbance.   Respiratory: Negative.   "Negative for chest tightness and shortness of breath.    Cardiovascular: Negative.  Negative for chest pain.   Gastrointestinal: Negative.  Negative for diarrhea, nausea and vomiting.   Endocrine: Negative.  Negative for cold intolerance and heat intolerance.   Genitourinary: Negative.  Negative for difficulty urinating.   Musculoskeletal: Negative.  Negative for back pain.   Skin: Negative.  Negative for rash.   Allergic/Immunologic: Negative.  Negative for environmental allergies.   Neurological: Negative.  Negative for tingling and numbness.   Hematological: Negative.  Does not bruise/bleed easily.   Psychiatric/Behavioral: Negative.  Negative for sleep disturbance.       Objective     Vitals:    09/29/20 0951   BP: 147/87   Pulse: 72   Temp: 97.5 °F (36.4 °C)   Weight: 87.1 kg (192 lb)   Height: 182.9 cm (72\")     Body mass index is 26.04 kg/m².      Physical Exam  Neurologic Exam    Physical Exam:    CONSTITUTIONAL: This 80 year old right handed  male appears well developed, well-nourished and in no acute distress.    HEAD & FACE: the head and face are symmetric, normocephalic and atraumatic.    EYES: Inspection of the conjunctivae and lids reveals no swelling, erythema or discharge.  Pupils are round, equal and reactive to light and there is no scleral icterus.    EARS, NOSE, MOUTH & THROAT: On inspection, the ears and nose are within normal limits.    NECK: the neck is supple and symmetric. The trachea is midline with no masses.    PULMONARY: Respiratory effort is normal with no increased work of breathing or signs of respiratory distress.    CARDIOVASCULAR: Pedal pulses are +1/4 bilaterally. Examination of the extremities shows no edema or varicosities.    LYMPHATIC: There is no palpable lymphadenopathy of the neck.    MUSCULOSKELETAL: Gait and station are within normal limits. The spine has normal alignment and range of motion.    SKIN: The skin is warm, dry and intact    NEUROLOGIC:   Cranial " Nerves 2-12 intact  Normal motor strength noted. Muscle bulk and tone are normal.  Sensory exam is normal to fine touch to confrontational testing bilaterally  Reflexes on the right side demonstrates 1/4 Knee Jerk Reflex, 0/4 Ankle Jerk Reflex and no ankle clonus on the right.   Reflexes on the left side demonstrates  1/4 Knee Jerk Reflex, 0/4 Ankle Jerk Reflex and no ankle clonus on the left.  Superficial/Primitive Reflexes: primitive reflexes were absent.  Hurst's, Babinski, and Clonus signs all negative.  No coordination deficit observed.  Radicular testing showed a negative Michael (TAD) test and negative straight leg raise.  Cortical function is intact and without deficits. Speech is normal.    PSYCHIATRIC: oriented to person, place and time. Patient's mood and affect are normal.    Assessment/Plan   Independent Review of Radiographic Studies:      I personally reviewed the images from the following studies.    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  This is compared to a CT scan of the abdomen pelvis done on Carol 3, 2016 and is felt to be similar in size.     More importantly relating to his symptomatology there is a right foraminal disc herniation at L3-4 that could cause right L3 nerve root compression.    Medical Decision Making:      It does appear he suffered the golfing injury with a disc herniation to the right at L3-4 contacting the L3 nerve root.  The symptoms have resolved by the time he got into see me as most patients experience.  80% of patients with disc herniation do not go on to require surgery with conservative management.  Think is reasonable for him to get back to golf that he do a short course of physical therapy 2-3 times a week for 3 weeks.  We will see  him back upon completion of physical therapy to make sure his symptoms resolve completely.    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  There is no record that he ever had a spinal tap to suggest this could be a dermoid cyst but certainly with evidence that this was there in 2016 and he is completely asymptomatic from it it should not require any neurosurgical follow-up or observation.    Return in about 4 weeks (around 10/27/2020) for discussion of Physical Therapy results.    Jayme was seen today for back pain.    Diagnoses and all orders for this visit:    Lumbar disc herniation  -     Ambulatory Referral to Physical Therapy Evaluate and treat    Fibrolipoma of filum terminale             Jayme Navarro MD FACS [facility]  Neurological Surgery

## 2020-09-25 ENCOUNTER — TELEPHONE (OUTPATIENT)
Dept: NEUROSURGERY | Facility: CLINIC | Age: 80
End: 2020-09-25

## 2020-09-25 NOTE — TELEPHONE ENCOUNTER
[facility] for patient to see if he wanted to come in 09/28/20 to see Dr. Navarro instead. Thx.

## 2020-09-29 ENCOUNTER — OFFICE VISIT (OUTPATIENT)
Dept: NEUROSURGERY | Facility: CLINIC | Age: 80
End: 2020-09-29

## 2020-09-29 VITALS
DIASTOLIC BLOOD PRESSURE: 87 MMHG | SYSTOLIC BLOOD PRESSURE: 147 MMHG | HEIGHT: 72 IN | HEART RATE: 72 BPM | BODY MASS INDEX: 26.01 KG/M2 | WEIGHT: 192 LBS | TEMPERATURE: 97.5 F

## 2020-09-29 DIAGNOSIS — D17.79 FIBROLIPOMA OF FILUM TERMINALE: ICD-10-CM

## 2020-09-29 DIAGNOSIS — M51.26 LUMBAR DISC HERNIATION: Primary | ICD-10-CM

## 2020-09-29 PROCEDURE — 99204 OFFICE O/P NEW MOD 45 MIN: CPT | Performed by: NEUROLOGICAL SURGERY

## 2020-11-03 ENCOUNTER — OFFICE VISIT (OUTPATIENT)
Dept: NEUROSURGERY | Facility: CLINIC | Age: 80
End: 2020-11-03

## 2020-11-03 VITALS
SYSTOLIC BLOOD PRESSURE: 120 MMHG | DIASTOLIC BLOOD PRESSURE: 80 MMHG | BODY MASS INDEX: 26.01 KG/M2 | HEIGHT: 72 IN | TEMPERATURE: 97.5 F | WEIGHT: 192 LBS

## 2020-11-03 DIAGNOSIS — D17.79 FIBROLIPOMA OF FILUM TERMINALE: ICD-10-CM

## 2020-11-03 DIAGNOSIS — M51.26 LUMBAR DISC HERNIATION: Primary | ICD-10-CM

## 2020-11-03 PROCEDURE — 99213 OFFICE O/P EST LOW 20 MIN: CPT | Performed by: NEUROLOGICAL SURGERY

## 2021-02-08 ENCOUNTER — IMMUNIZATION (OUTPATIENT)
Dept: VACCINE CLINIC | Facility: HOSPITAL | Age: 81
End: 2021-02-08

## 2021-02-08 PROCEDURE — 91300 HC SARSCOV02 VAC 30MCG/0.3ML IM: CPT | Performed by: INTERNAL MEDICINE

## 2021-02-08 PROCEDURE — 0001A: CPT | Performed by: INTERNAL MEDICINE

## 2021-03-01 ENCOUNTER — IMMUNIZATION (OUTPATIENT)
Dept: VACCINE CLINIC | Facility: HOSPITAL | Age: 81
End: 2021-03-01

## 2021-03-01 PROCEDURE — 91300 HC SARSCOV02 VAC 30MCG/0.3ML IM: CPT | Performed by: INTERNAL MEDICINE

## 2021-03-01 PROCEDURE — 0002A: CPT | Performed by: INTERNAL MEDICINE

## 2021-06-03 ENCOUNTER — OFFICE VISIT (OUTPATIENT)
Dept: CARDIOLOGY | Facility: CLINIC | Age: 81
End: 2021-06-03

## 2021-06-03 VITALS
HEART RATE: 65 BPM | SYSTOLIC BLOOD PRESSURE: 130 MMHG | DIASTOLIC BLOOD PRESSURE: 88 MMHG | HEIGHT: 72 IN | BODY MASS INDEX: 27.36 KG/M2 | WEIGHT: 202 LBS

## 2021-06-03 DIAGNOSIS — R42 DIZZINESS: ICD-10-CM

## 2021-06-03 DIAGNOSIS — E78.5 HYPERLIPIDEMIA LDL GOAL <100: ICD-10-CM

## 2021-06-03 DIAGNOSIS — R06.02 SHORTNESS OF BREATH: Primary | ICD-10-CM

## 2021-06-03 PROCEDURE — 93000 ELECTROCARDIOGRAM COMPLETE: CPT | Performed by: INTERNAL MEDICINE

## 2021-06-03 PROCEDURE — 99204 OFFICE O/P NEW MOD 45 MIN: CPT | Performed by: INTERNAL MEDICINE

## 2021-06-03 NOTE — PROGRESS NOTES
Jayme Zaragoza  1940  Date of Office Visit: 06/03/21  Encounter Provider: Keyshawn Hardy MD  Place of Service: [facility] CARDIOLOGY      CHIEF COMPLAINT:  Dyspnea  Dizziness      HISTORY OF PRESENT ILLNESS: I had the pleasure of seeing Mr. Zaragoza in consultation.  Is a pleasant 81-year-old male with medical history of hyperlipidemia, prior DVT with recurrence, who was on Xarelto therapy chronically, gastroesophageal reflux disease who presents to me for evaluation of dizziness but also dyspnea on a when he was in [place].  He states that this is at about 3500 feet in elevation and when he was there he had occasional lightheadedness that was present when sitting but also with positional change.  He also had dyspnea and fatigue when playing golf.  He denied any chest pain.  No orthopnea or PND.  The dyspnea was present both at rest and with exertion.  No orthopnea noted.    When he came back to [place] the symptoms resolved.  He has had no recurrence.  He underwent electrocardiogram with no evidence of ischemia or infarction.  He states that overall he feels fine currently.    Review of Systems   Constitutional: Negative for fever and malaise/fatigue.   HENT: Negative for nosebleeds and sore throat.    Eyes: Negative for blurred vision and double vision.   Cardiovascular: Negative for chest pain, claudication, palpitations and syncope.   Respiratory: Negative for cough, shortness of breath and snoring.    Endocrine: Negative for cold intolerance, heat intolerance and polydipsia.   Skin: Negative for itching, poor wound healing and rash.   Musculoskeletal: Negative for joint pain, joint swelling, muscle weakness and myalgias.   Gastrointestinal: Negative for abdominal pain, melena, nausea and vomiting.   Neurological: Negative for light-headedness, loss of balance, seizures, vertigo and weakness.   Psychiatric/Behavioral: Negative for altered mental status and  "depression.          Past Medical History:   Diagnosis Date   • Anemia    • Anxiety    • Arthritis    • Cancer (CMS/HCC)     skin cancer   • Deep vein thrombosis (CMS/HCC)     Right leg separate instance from arm   • Deep vein thrombosis (DVT) (CMS/HCC)     Left arm   • History of hepatitis 1960    Pt says type B, but possible type A. Resolved    • History of transfusion    • Hyperlipidemia        The following portions of the patient's history were reviewed and updated as appropriate: Social history , Family history and Surgical history     Current Outpatient Medications on File Prior to Visit   Medication Sig Dispense Refill   • esomeprazole (NexIUM) 20 MG capsule Take 20 mg by mouth every morning before breakfast.     • Multiple Vitamins-Minerals (CENTRUM SILVER ADULT 50+ PO) Take 1 tablet by mouth daily.     • sertraline (ZOLOFT) 25 MG tablet Take 25 mg by mouth daily.     • simvastatin (ZOCOR) 10 MG tablet Take 10 mg by mouth every night.     • vitamin B-12 (CYANOCOBALAMIN) 100 MCG tablet Take 50 mcg by mouth Daily.     • vitamin C (ASCORBIC ACID) 250 MG tablet Take 250 mg by mouth Daily.     • XARELTO 10 MG tablet Take 10 mg by mouth Daily.       No current facility-administered medications on file prior to visit.       No Known Allergies    Vitals:    06/03/21 0835   BP: 130/88   Pulse: 65   Weight: 91.6 kg (202 lb)   Height: 182.9 cm (72\")     Body mass index is 27.4 kg/m².   Constitutional:       Appearance: Well-developed.   Eyes:      General: No scleral icterus.     Conjunctiva/sclera: Conjunctivae normal.   HENT:      Head: Normocephalic and atraumatic.   Neck:      Thyroid: No thyromegaly.      Vascular: Normal carotid pulses. No carotid bruit, hepatojugular reflux or JVD.      Trachea: No tracheal deviation.   Pulmonary:      Effort: No respiratory distress.      Breath sounds: Normal breath sounds. No decreased breath sounds. No wheezing. No rhonchi. No rales.   Chest:      Chest wall: Not tender to " palpatation.   Cardiovascular:      Normal rate. Regular rhythm.      No gallop.   Pulses:     Carotid: 2+ bilaterally.     Radial: 2+ bilaterally.     Femoral: 2+ bilaterally.     Dorsalis pedis: 2+ bilaterally.     Posterior tibial: 2+ bilaterally.  Edema:     Peripheral edema absent.   Abdominal:      General: Bowel sounds are normal. There is no distension.      Palpations: Abdomen is soft.      Tenderness: There is no abdominal tenderness.   Musculoskeletal:         General: No deformity.      Cervical back: Normal range of motion and neck supple. Skin:     Findings: No erythema or rash.   Neurological:      Mental Status: Alert and oriented to person, place, and time.      Sensory: No sensory deficit.   Psychiatric:         Behavior: Behavior normal.         Lab Results   Component Value Date    WBC 5.77 11/12/2018    HGB 14.1 11/12/2018    HCT 41.6 11/12/2018    MCV 97.2 (H) 11/12/2018     11/12/2018       Lab Results   Component Value Date    GLUCOSE 100 (H) 11/12/2018    BUN 19 11/12/2018    CREATININE 1.30 09/08/2020    EGFRIFNONA 60 (L) 11/12/2018    BCR 16.2 11/12/2018    K 4.4 11/12/2018    CO2 25.2 11/12/2018    CALCIUM 9.7 11/12/2018    ALBUMIN 4.30 11/12/2018    AST 18 11/12/2018    ALT 17 11/12/2018       Lab Results   Component Value Date    GLUCOSE 100 (H) 11/12/2018    CALCIUM 9.7 11/12/2018     11/12/2018    K 4.4 11/12/2018    CO2 25.2 11/12/2018     11/12/2018    BUN 19 11/12/2018    CREATININE 1.30 09/08/2020    EGFRIFNONA 60 (L) 11/12/2018    BCR 16.2 11/12/2018    ANIONGAP 10.8 11/12/2018       No results found for: CHOL, CHLPL, TRIG, HDL, LDL, LDLDIRECT      ECG 12 Lead    Date/Time: 6/3/2021 9:11 AM  Performed by: Keyshawn Hardy MD  Authorized by: Keyshawn Hardy MD   Comparison: compared with previous ECG from 4/30/2021  Similar to previous ECG  Rhythm: sinus rhythm  Rate: normal  QRS axis: left    Clinical impression: non-specific ECG                  DISCUSSION/SUMMARY  Very pleasant 81-year-old male with a medical history of DVT, chronic anticoagulation, hyperlipidemia, who presents to me for evaluation of dizziness and dyspnea while he was on vacation.  This was at a higher altitude and this may have contributed to his symptoms.  Symptoms have resolved at this point in time.  His EKG shows no evidence of ischemia or infarction.  He has not had any chest pain and I do not think ischemic evaluation is indicated    1.  Dyspnea: Resolved.  Present during his vacation  -His pulmonary exam is normal.  He is not volume overloaded.  -He is on Xarelto and I do not think we need to worry about pulmonary embolus in the setting of that and normal heart rate.  -I would recommend a transthoracic echocardiogram just to evaluate his left ventricular size and systolic function and rule out valve issues.    2.  Dizziness: Resolved.  No additional work-up indicated.    3.  Hyperlipidemia: Tolerating simvastatin therapy at current dose.  Lab work from primary care reviewed.   on last check.  Acceptable.    I will see him back as needed.

## 2021-06-18 ENCOUNTER — HOSPITAL ENCOUNTER (OUTPATIENT)
Dept: CARDIOLOGY | Facility: HOSPITAL | Age: 81
Discharge: HOME | End: 2021-06-18
Admitting: INTERNAL MEDICINE

## 2021-06-18 DIAGNOSIS — R06.02 SHORTNESS OF BREATH: ICD-10-CM

## 2021-06-18 DIAGNOSIS — E78.5 HYPERLIPIDEMIA LDL GOAL <100: ICD-10-CM

## 2021-06-18 LAB
AORTIC ARCH: 2.1 CM
ASCENDING AORTA: 3.3 CM
BH CV ECHO MEAS - ACS: 2.1 CM
BH CV ECHO MEAS - AO ARCH DIAM (PROXIMAL TRANS.): 2.1 CM
BH CV ECHO MEAS - AO MAX PG (FULL): 5.4 MMHG
BH CV ECHO MEAS - AO MAX PG: 7.6 MMHG
BH CV ECHO MEAS - AO MEAN PG (FULL): 3 MMHG
BH CV ECHO MEAS - AO MEAN PG: 4 MMHG
BH CV ECHO MEAS - AO ROOT AREA (BSA CORRECTED): 1.7
BH CV ECHO MEAS - AO ROOT AREA: 10.8 CM^2
BH CV ECHO MEAS - AO ROOT DIAM: 3.7 CM
BH CV ECHO MEAS - AO V2 MAX: 138 CM/SEC
BH CV ECHO MEAS - AO V2 MEAN: 93.9 CM/SEC
BH CV ECHO MEAS - AO V2 VTI: 29.7 CM
BH CV ECHO MEAS - ASC AORTA: 3.3 CM
BH CV ECHO MEAS - AVA(I,A): 1.7 CM^2
BH CV ECHO MEAS - AVA(I,D): 1.7 CM^2
BH CV ECHO MEAS - AVA(V,A): 1.9 CM^2
BH CV ECHO MEAS - AVA(V,D): 1.9 CM^2
BH CV ECHO MEAS - BSA(HAYCOCK): 2.2 M^2
BH CV ECHO MEAS - BSA: 2.1 M^2
BH CV ECHO MEAS - BZI_BMI: 27.4 KILOGRAMS/M^2
BH CV ECHO MEAS - BZI_METRIC_HEIGHT: 182.9 CM
BH CV ECHO MEAS - BZI_METRIC_WEIGHT: 91.6 KG
BH CV ECHO MEAS - EDV(MOD-SP2): 133 ML
BH CV ECHO MEAS - EDV(MOD-SP4): 107 ML
BH CV ECHO MEAS - EDV(TEICH): 107.5 ML
BH CV ECHO MEAS - EF(CUBED): 67.5 %
BH CV ECHO MEAS - EF(MOD-BP): 62.6 %
BH CV ECHO MEAS - EF(MOD-SP2): 57.9 %
BH CV ECHO MEAS - EF(MOD-SP4): 66.4 %
BH CV ECHO MEAS - EF(TEICH): 59 %
BH CV ECHO MEAS - ESV(MOD-SP2): 56 ML
BH CV ECHO MEAS - ESV(MOD-SP4): 36 ML
BH CV ECHO MEAS - ESV(TEICH): 44.1 ML
BH CV ECHO MEAS - FS: 31.3 %
BH CV ECHO MEAS - IVS/LVPW: 1
BH CV ECHO MEAS - IVSD: 0.9 CM
BH CV ECHO MEAS - LAT PEAK E' VEL: 7.7 CM/SEC
BH CV ECHO MEAS - LV DIASTOLIC VOL/BSA (35-75): 50 ML/M^2
BH CV ECHO MEAS - LV MASS(C)D: 147.8 GRAMS
BH CV ECHO MEAS - LV MASS(C)DI: 69.1 GRAMS/M^2
BH CV ECHO MEAS - LV MAX PG: 2.2 MMHG
BH CV ECHO MEAS - LV MEAN PG: 1 MMHG
BH CV ECHO MEAS - LV SYSTOLIC VOL/BSA (12-30): 16.8 ML/M^2
BH CV ECHO MEAS - LV V1 MAX: 74.4 CM/SEC
BH CV ECHO MEAS - LV V1 MEAN: 55.6 CM/SEC
BH CV ECHO MEAS - LV V1 VTI: 15 CM
BH CV ECHO MEAS - LVIDD: 4.8 CM
BH CV ECHO MEAS - LVIDS: 3.3 CM
BH CV ECHO MEAS - LVLD AP2: 7.5 CM
BH CV ECHO MEAS - LVLD AP4: 7.7 CM
BH CV ECHO MEAS - LVLS AP2: 6.1 CM
BH CV ECHO MEAS - LVLS AP4: 6.2 CM
BH CV ECHO MEAS - LVOT AREA (M): 3.5 CM^2
BH CV ECHO MEAS - LVOT AREA: 3.5 CM^2
BH CV ECHO MEAS - LVOT DIAM: 2.1 CM
BH CV ECHO MEAS - LVPWD: 0.9 CM
BH CV ECHO MEAS - MED PEAK E' VEL: 8.9 CM/SEC
BH CV ECHO MEAS - MR MAX PG: 27.5 MMHG
BH CV ECHO MEAS - MR MAX VEL: 262 CM/SEC
BH CV ECHO MEAS - MV A DUR: 0.11 SEC
BH CV ECHO MEAS - MV A MAX VEL: 62.6 CM/SEC
BH CV ECHO MEAS - MV DEC SLOPE: 386 CM/SEC^2
BH CV ECHO MEAS - MV DEC TIME: 0.16 SEC
BH CV ECHO MEAS - MV E MAX VEL: 68.1 CM/SEC
BH CV ECHO MEAS - MV E/A: 1.1
BH CV ECHO MEAS - MV MAX PG: 2.5 MMHG
BH CV ECHO MEAS - MV MEAN PG: 1 MMHG
BH CV ECHO MEAS - MV P1/2T MAX VEL: 75.4 CM/SEC
BH CV ECHO MEAS - MV P1/2T: 57.2 MSEC
BH CV ECHO MEAS - MV V2 MAX: 78.8 CM/SEC
BH CV ECHO MEAS - MV V2 MEAN: 43.2 CM/SEC
BH CV ECHO MEAS - MV V2 VTI: 23.7 CM
BH CV ECHO MEAS - MVA P1/2T LCG: 2.9 CM^2
BH CV ECHO MEAS - MVA(P1/2T): 3.8 CM^2
BH CV ECHO MEAS - MVA(VTI): 2.2 CM^2
BH CV ECHO MEAS - PA ACC TIME: 0.15 SEC
BH CV ECHO MEAS - PA MAX PG (FULL): 2.2 MMHG
BH CV ECHO MEAS - PA MAX PG: 3.7 MMHG
BH CV ECHO MEAS - PA PR(ACCEL): 12.4 MMHG
BH CV ECHO MEAS - PA V2 MAX: 96.4 CM/SEC
BH CV ECHO MEAS - PI END-D VEL: 67.9 CM/SEC
BH CV ECHO MEAS - PULM A REVS DUR: 0.11 SEC
BH CV ECHO MEAS - PULM A REVS VEL: 30.5 CM/SEC
BH CV ECHO MEAS - PULM DIAS VEL: 41.9 CM/SEC
BH CV ECHO MEAS - PULM S/D: 1.3
BH CV ECHO MEAS - PULM SYS VEL: 52.8 CM/SEC
BH CV ECHO MEAS - PVA(V,A): 2 CM^2
BH CV ECHO MEAS - PVA(V,D): 2 CM^2
BH CV ECHO MEAS - QP/QS: 0.83
BH CV ECHO MEAS - RAP SYSTOLE: 3 MMHG
BH CV ECHO MEAS - RV MAX PG: 1.5 MMHG
BH CV ECHO MEAS - RV MEAN PG: 1 MMHG
BH CV ECHO MEAS - RV V1 MAX: 62 CM/SEC
BH CV ECHO MEAS - RV V1 MEAN: 44.6 CM/SEC
BH CV ECHO MEAS - RV V1 VTI: 13.8 CM
BH CV ECHO MEAS - RVOT AREA: 3.1 CM^2
BH CV ECHO MEAS - RVOT DIAM: 2 CM
BH CV ECHO MEAS - RVSP: 23 MMHG
BH CV ECHO MEAS - SI(AO): 149.3 ML/M^2
BH CV ECHO MEAS - SI(CUBED): 34.9 ML/M^2
BH CV ECHO MEAS - SI(LVOT): 24.3 ML/M^2
BH CV ECHO MEAS - SI(MOD-SP2): 36 ML/M^2
BH CV ECHO MEAS - SI(MOD-SP4): 33.2 ML/M^2
BH CV ECHO MEAS - SI(TEICH): 29.6 ML/M^2
BH CV ECHO MEAS - SUP REN AO DIAM: 2.2 CM
BH CV ECHO MEAS - SV(AO): 319.3 ML
BH CV ECHO MEAS - SV(CUBED): 74.7 ML
BH CV ECHO MEAS - SV(LVOT): 52 ML
BH CV ECHO MEAS - SV(MOD-SP2): 77 ML
BH CV ECHO MEAS - SV(MOD-SP4): 71 ML
BH CV ECHO MEAS - SV(RVOT): 43.4 ML
BH CV ECHO MEAS - SV(TEICH): 63.4 ML
BH CV ECHO MEAS - TAPSE (>1.6): 2.9 CM
BH CV ECHO MEAS - TR MAX VEL: 221 CM/SEC
BH CV ECHO MEASUREMENTS AVERAGE E/E' RATIO: 8.2
BH CV XLRA - RV BASE: 4.7 CM
BH CV XLRA - RV LENGTH: 7 CM
BH CV XLRA - RV MID: 3.2 CM
BH CV XLRA - TDI S': 12.1 CM/SEC
LEFT ATRIUM VOLUME INDEX: 19 ML/M2
LV EF 2D ECHO EST: 65 %
MAXIMAL PREDICTED HEART RATE: 137 BPM
SINUS: 3.8 CM
STJ: 2.7 CM
STRESS TARGET HR: 116 BPM

## 2021-06-18 PROCEDURE — 93306 TTE W/DOPPLER COMPLETE: CPT

## 2021-06-18 PROCEDURE — 25010000002 PERFLUTREN (DEFINITY) 8.476 MG IN SODIUM CHLORIDE (PF) 0.9 % 10 ML INJECTION: Performed by: INTERNAL MEDICINE

## 2021-06-18 PROCEDURE — 93306 TTE W/DOPPLER COMPLETE: CPT | Performed by: INTERNAL MEDICINE

## 2021-06-18 RX ADMIN — PERFLUTREN 1.5 ML: 6.52 INJECTION, SUSPENSION INTRAVENOUS at 12:30

## 2021-06-21 ENCOUNTER — TELEPHONE (OUTPATIENT)
Dept: CARDIOLOGY | Facility: CLINIC | Age: 81
End: 2021-06-21

## 2021-06-21 NOTE — TELEPHONE ENCOUNTER
----- Message from Keyshawn Hardy MD sent at 6/21/2021  9:08 AM EDT -----  Please let patient know this is normal.

## 2021-06-21 NOTE — TELEPHONE ENCOUNTER
Called pt and gave him the results of his Echo. I forwarded the results of his echo to Dr. Smyth per his request.    Genna

## 2021-12-06 ENCOUNTER — TRANSCRIBE ORDERS (OUTPATIENT)
Dept: ADMINISTRATIVE | Facility: HOSPITAL | Age: 81
End: 2021-12-06

## 2021-12-06 DIAGNOSIS — R00.2 PALPITATIONS: Primary | ICD-10-CM

## 2021-12-14 ENCOUNTER — HOSPITAL ENCOUNTER (OUTPATIENT)
Dept: CARDIOLOGY | Facility: HOSPITAL | Age: 81
Discharge: HOME | End: 2021-12-14
Admitting: INTERNAL MEDICINE

## 2021-12-14 DIAGNOSIS — R00.2 PALPITATIONS: ICD-10-CM

## 2021-12-14 PROCEDURE — 93225 XTRNL ECG REC<48 HRS REC: CPT

## 2021-12-14 PROCEDURE — 93226 XTRNL ECG REC<48 HR SCAN A/R: CPT

## 2021-12-16 LAB
MAXIMAL PREDICTED HEART RATE: 137 BPM
STRESS TARGET HR: 116 BPM

## 2021-12-16 PROCEDURE — 93227 XTRNL ECG REC<48 HR R&I: CPT | Performed by: INTERNAL MEDICINE

## 2021-12-17 ENCOUNTER — TELEPHONE (OUTPATIENT)
Dept: CARDIOLOGY | Facility: CLINIC | Age: 81
End: 2021-12-17

## 2021-12-17 DIAGNOSIS — I47.1 PAROXYSMAL SVT (SUPRAVENTRICULAR TACHYCARDIA) (HCC): Primary | ICD-10-CM

## 2021-12-17 NOTE — TELEPHONE ENCOUNTER
----- Message from Keyshawn Hardy MD sent at 12/17/2021  8:49 AM EST -----  Genna or Johny need to get this patient in to see us this upcoming week.  Frequent SVT.  Also please work on electrophysiology appointment as well.

## 2021-12-20 ENCOUNTER — OFFICE VISIT (OUTPATIENT)
Dept: CARDIOLOGY | Facility: CLINIC | Age: 81
End: 2021-12-20

## 2021-12-20 VITALS
BODY MASS INDEX: 27.04 KG/M2 | SYSTOLIC BLOOD PRESSURE: 110 MMHG | DIASTOLIC BLOOD PRESSURE: 60 MMHG | WEIGHT: 199.6 LBS | HEART RATE: 80 BPM | HEIGHT: 72 IN

## 2021-12-20 DIAGNOSIS — I47.1 PAROXYSMAL SVT (SUPRAVENTRICULAR TACHYCARDIA) (HCC): Primary | ICD-10-CM

## 2021-12-20 PROCEDURE — 99214 OFFICE O/P EST MOD 30 MIN: CPT | Performed by: INTERNAL MEDICINE

## 2021-12-20 PROCEDURE — 93000 ELECTROCARDIOGRAM COMPLETE: CPT | Performed by: INTERNAL MEDICINE

## 2021-12-20 NOTE — PROGRESS NOTES
Jayme Zaragoza  1940  Date of Office Visit: 12/20/21  Encounter Provider: Keyshawn Hardy MD  Place of Service: [facility] CARDIOLOGY      CHIEF COMPLAINT:  Paroxysmal SVT  Lightheadedness    HISTORY OF PRESENT ILLNESS:   81-year-old male with medical history of hyperlipidemia, prior DVT with recurrence, who was on Xarelto therapy chronically, gastroesophageal reflux disease who actually presented to me for evaluation of dizziness but also dyspnea on a when he was in [place].  He stated that this is at about 3500 feet in elevation and when he was there he had occasional lightheadedness that was present when sitting but also with positional change.  He also had dyspnea and fatigue when playing golf.    He underwent transthoracic echocardiogram which was normal.  Since our last visit he is reported additional episodes where he feels fatigued or his legs will give out.  He denies complete episodes of syncope.  He did wear a monitor that showed him to have frequent episodes of supraventricular tachycardia as documented below.  The heart rate was approximately 180 bpm.  I do not think this was atrial flutter but more likely a reentrant tachycardia with that rate.  Patient denies a sensation of tachycardia, palpitations or chest pain.  He was placed on Lopressor therapy by his primary care physician.  He is here today for follow-up.      Review of Systems   Constitutional: Negative for fever and malaise/fatigue.   HENT: Negative for nosebleeds and sore throat.    Eyes: Negative for blurred vision and double vision.   Cardiovascular: Positive for near-syncope. Negative for chest pain, claudication, palpitations and syncope.   Respiratory: Negative for cough, shortness of breath and snoring.    Endocrine: Negative for cold intolerance, heat intolerance and polydipsia.   Skin: Negative for itching, poor wound healing and rash.   Musculoskeletal: Negative for joint pain,  "joint swelling, muscle weakness and myalgias.   Gastrointestinal: Negative for abdominal pain, melena, nausea and vomiting.   Neurological: Negative for light-headedness, loss of balance, seizures, vertigo and weakness.   Psychiatric/Behavioral: Negative for altered mental status and depression.          Past Medical History:   Diagnosis Date   • Anemia    • Anxiety    • Arthritis    • Cancer (HCC)     skin cancer   • Deep vein thrombosis (DVT) (HCC)     Left arm   • Deep vein thrombosis (HCC)     Right leg separate instance from arm   • History of hepatitis 1960    Pt says type B, but possible type A. Resolved    • History of transfusion    • Hyperlipidemia        The following portions of the patient's history were reviewed and updated as appropriate: Social history , Family history and Surgical history     Current Outpatient Medications on File Prior to Visit   Medication Sig Dispense Refill   • esomeprazole (NexIUM) 20 MG capsule Take 20 mg by mouth every morning before breakfast.     • metoprolol tartrate (LOPRESSOR) 25 MG tablet Take 25 mg by mouth 2 (Two) Times a Day.     • Multiple Vitamins-Minerals (CENTRUM SILVER ADULT 50+ PO) Take 1 tablet by mouth daily.     • sertraline (ZOLOFT) 25 MG tablet Take 25 mg by mouth daily.     • simvastatin (ZOCOR) 10 MG tablet Take 10 mg by mouth every night.     • vitamin B-12 (CYANOCOBALAMIN) 100 MCG tablet Take 50 mcg by mouth Daily.     • vitamin C (ASCORBIC ACID) 250 MG tablet Take 250 mg by mouth Daily.     • XARELTO 10 MG tablet Take 10 mg by mouth Daily.       No current facility-administered medications on file prior to visit.       No Known Allergies    Vitals:    12/20/21 1346   BP: 110/60   BP Location: Left arm   Pulse: 80   Weight: 90.5 kg (199 lb 9.6 oz)   Height: 182.9 cm (72\")     Body mass index is 27.07 kg/m².   Constitutional:       Appearance: Well-developed.   Eyes:      General: No scleral icterus.     Conjunctiva/sclera: Conjunctivae normal.   HENT: "      Head: Normocephalic and atraumatic.   Neck:      Thyroid: No thyromegaly.      Vascular: Normal carotid pulses. No carotid bruit, hepatojugular reflux or JVD.      Trachea: No tracheal deviation.   Pulmonary:      Effort: No respiratory distress.      Breath sounds: Normal breath sounds. No decreased breath sounds. No wheezing. No rhonchi. No rales.   Chest:      Chest wall: Not tender to palpatation.   Cardiovascular:      Normal rate. Regular rhythm.      No gallop.   Pulses:     Carotid: 2+ bilaterally.     Radial: 2+ bilaterally.     Femoral: 2+ bilaterally.     Dorsalis pedis: 2+ bilaterally.     Posterior tibial: 2+ bilaterally.  Edema:     Peripheral edema absent.   Abdominal:      General: Bowel sounds are normal. There is no distension.      Palpations: Abdomen is soft.      Tenderness: There is no abdominal tenderness.   Musculoskeletal:         General: No deformity.      Cervical back: Normal range of motion and neck supple. Skin:     Findings: No erythema or rash.   Neurological:      Mental Status: Alert and oriented to person, place, and time.      Sensory: No sensory deficit.   Psychiatric:         Behavior: Behavior normal.            Lab Results   Component Value Date    WBC 5.77 11/12/2018    HGB 14.1 11/12/2018    HCT 41.6 11/12/2018    MCV 97.2 (H) 11/12/2018     11/12/2018       Lab Results   Component Value Date    GLUCOSE 100 (H) 11/12/2018    BUN 19 11/12/2018    CREATININE 1.30 09/08/2020    EGFRIFNONA 60 (L) 11/12/2018    BCR 16.2 11/12/2018    K 4.4 11/12/2018    CO2 25.2 11/12/2018    CALCIUM 9.7 11/12/2018    ALBUMIN 4.30 11/12/2018    AST 18 11/12/2018    ALT 17 11/12/2018       Lab Results   Component Value Date    GLUCOSE 100 (H) 11/12/2018    CALCIUM 9.7 11/12/2018     11/12/2018    K 4.4 11/12/2018    CO2 25.2 11/12/2018     11/12/2018    BUN 19 11/12/2018    CREATININE 1.30 09/08/2020    EGFRIFNONA 60 (L) 11/12/2018    BCR 16.2 11/12/2018    ANIONGAP 10.8  11/12/2018       No results found for: CHOL, CHLPL, TRIG, HDL, LDL, LDLDIRECT      ECG 12 Lead    Date/Time: 12/20/2021 2:17 PM  Performed by: Keyshawn Hardy MD  Authorized by: Keyshawn Hardy MD   Comparison: compared with previous ECG from 6/3/2021  Similar to previous ECG  Rhythm: sinus rhythm  Rate: normal  QRS axis: left               12/16/21  Underlying sinus rhythm.  Rates varied from 53 bpm to 197 bpm.  Average heart rate 79 bpm  There were frequent ectopic atrial beats.  This comprised 12.7% of total QRS complexes.  There were a total of 28,783 ectopic atrial beats.  There were frequent atrial couplets.  Frequent episodes of SVT (140 episodes).  The longest episode consisted of 14,546 beats.  This occurred on day 2 of monitoring from 10:41 AM to 2:18 PM.  Maximum heart rate was 197 bpm.  Rhythm appeared to be atrial tachycardia versus atrial flutter  There were frequent PVCs comprising 6.7% of total QRS complexes (15,160 ventricular ectopic beats) there were frequent episodes of ventricular bigeminy and trigeminy.  Occasional ventricular couplets.  No ventricular tachycardia  There was one diary entry of lightheadedness which occurred on day 1 at 11 AM.  The patient had a sinus rhythm with a single PVC      Results for orders placed during the hospital encounter of 06/18/21    Adult Transthoracic Echo Complete w/ Color, Spectral and Contrast if Necessary Per Protocol    Interpretation Summary  · Estimated left ventricular EF = 65% Left ventricular systolic function is normal.  · Left ventricular diastolic function was normal.      DISCUSSION/SUMMARY  Very pleasant 81-year-old male with a medical history as documented above including lightheadedness, normal echo, and more recently SVT with the longest episode lasting about 3 hours.  The ventricular rate is about 160-180 bpm and does not appear to be consistent with atrial flutter.  Although he denies palpitations or tachycardia I do wonder if  this is contributing to his symptoms of lightheadedness.  He was placed on a beta-blocker and I do think the first step for us is to see if beta-blocker therapy has suppressed his SVT or if he is continue to have multiple breakthrough episodes.  If so we will try to move up his EP evaluation for consideration of ablation therapy.    1.  Paroxysmal SVT: Agree with Toprol therapy.  Patient does have episodes of lightheadedness but denies palpitations or tachycardia.    -I have recommended that he wear a Zio patch while on beta-blockade as documented above.  This will evaluate whether we are having appropriate suppression of his SVT.    2.  Hyperlipidemia: Continue simvastatin therapy.  No new complaints on that therapy.    I will touch base the patient after his monitor is complete.

## 2022-01-10 ENCOUNTER — TELEPHONE (OUTPATIENT)
Dept: CARDIOLOGY | Facility: CLINIC | Age: 82
End: 2022-01-10

## 2022-01-10 NOTE — TELEPHONE ENCOUNTER
[facility] called with a report on this patient.    Pt had 37 episodes of SVT, the longest was rate of 185 x 1 minute.  They are uploading the report now. You can find that on page 16 strip #8.    Pt also had 1 episode of VT that was non sustained.    Pt asymptomatic with all episodes.    Thank you,  Ana Maria Norman RN  [facility]  Triage

## 2022-01-11 NOTE — TELEPHONE ENCOUNTER
Hilaria, please call patient to see if he can move his appt up to 11:00 on 1/19 with SANDIP MARIA to see with her.    Thanks!

## 2022-01-17 NOTE — TELEPHONE ENCOUNTER
Markus,  This gentleman still has episodes of supraventricular tachycardia that are sustained on beta-blockade.  He has an appointment scheduled in late March.  If you have any earlier availability please move him up.  Thanks.    Guillermo Smyth is his primary care doctor and I have included him on this note.   Cephalexin Counseling: I counseled the patient regarding use of cephalexin as an antibiotic for prophylactic and/or therapeutic purposes. Cephalexin (commonly prescribed under brand name Keflex) is a cephalosporin antibiotic which is active against numerous classes of bacteria, including most skin bacteria. Side effects may include nausea, diarrhea, gastrointestinal upset, rash, hives, yeast infections, and in rare cases, hepatitis, kidney disease, seizures, fever, confusion, neurologic symptoms, and others. Patients with severe allergies to penicillin medications are cautioned that there is about a 10% incidence of cross-reactivity with cephalosporins. When possible, patients with penicillin allergies should use alternatives to cephalosporins for antibiotic therapy.

## 2022-01-19 ENCOUNTER — OFFICE VISIT (OUTPATIENT)
Dept: CARDIOLOGY | Facility: CLINIC | Age: 82
End: 2022-01-19

## 2022-01-19 ENCOUNTER — TRANSCRIBE ORDERS (OUTPATIENT)
Dept: CARDIOLOGY | Facility: CLINIC | Age: 82
End: 2022-01-19

## 2022-01-19 ENCOUNTER — LAB (OUTPATIENT)
Dept: LAB | Facility: HOSPITAL | Age: 82
End: 2022-01-19

## 2022-01-19 VITALS
BODY MASS INDEX: 26.95 KG/M2 | SYSTOLIC BLOOD PRESSURE: 140 MMHG | DIASTOLIC BLOOD PRESSURE: 90 MMHG | WEIGHT: 199 LBS | HEIGHT: 72 IN | HEART RATE: 56 BPM

## 2022-01-19 DIAGNOSIS — Z01.818 OTHER SPECIFIED PRE-OPERATIVE EXAMINATION: ICD-10-CM

## 2022-01-19 DIAGNOSIS — Z01.810 PREPROCEDURAL CARDIOVASCULAR EXAMINATION: ICD-10-CM

## 2022-01-19 DIAGNOSIS — Z13.6 SCREENING FOR CARDIOVASCULAR CONDITION: Primary | ICD-10-CM

## 2022-01-19 DIAGNOSIS — I47.1 PAROXYSMAL SVT (SUPRAVENTRICULAR TACHYCARDIA): Primary | ICD-10-CM

## 2022-01-19 DIAGNOSIS — Z13.6 SCREENING FOR CARDIOVASCULAR CONDITION: ICD-10-CM

## 2022-01-19 PROBLEM — I47.10 PAROXYSMAL SVT (SUPRAVENTRICULAR TACHYCARDIA): Status: ACTIVE | Noted: 2022-01-19

## 2022-01-19 LAB
ANION GAP SERPL CALCULATED.3IONS-SCNC: 7 MMOL/L (ref 5–15)
BASOPHILS # BLD AUTO: 0.05 10*3/MM3 (ref 0–0.2)
BASOPHILS NFR BLD AUTO: 0.9 % (ref 0–1.5)
BUN SERPL-MCNC: 19 MG/DL (ref 8–23)
BUN/CREAT SERPL: 15.2 (ref 7–25)
CALCIUM SPEC-SCNC: 9.3 MG/DL (ref 8.6–10.5)
CHLORIDE SERPL-SCNC: 105 MMOL/L (ref 98–107)
CO2 SERPL-SCNC: 26 MMOL/L (ref 22–29)
CREAT SERPL-MCNC: 1.25 MG/DL (ref 0.76–1.27)
DEPRECATED RDW RBC AUTO: 44.3 FL (ref 37–54)
EOSINOPHIL # BLD AUTO: 0.1 10*3/MM3 (ref 0–0.4)
EOSINOPHIL NFR BLD AUTO: 1.8 % (ref 0.3–6.2)
ERYTHROCYTE [DISTWIDTH] IN BLOOD BY AUTOMATED COUNT: 12.7 % (ref 12.3–15.4)
GFR SERPL CREATININE-BSD FRML MDRD: 55 ML/MIN/1.73
GLUCOSE SERPL-MCNC: 94 MG/DL (ref 65–99)
HCT VFR BLD AUTO: 39 % (ref 37.5–51)
HGB BLD-MCNC: 13.6 G/DL (ref 13–17.7)
IMM GRANULOCYTES # BLD AUTO: 0.01 10*3/MM3 (ref 0–0.05)
IMM GRANULOCYTES NFR BLD AUTO: 0.2 % (ref 0–0.5)
LYMPHOCYTES # BLD AUTO: 1.91 10*3/MM3 (ref 0.7–3.1)
LYMPHOCYTES NFR BLD AUTO: 33.6 % (ref 19.6–45.3)
MCH RBC QN AUTO: 33.3 PG (ref 26.6–33)
MCHC RBC AUTO-ENTMCNC: 34.9 G/DL (ref 31.5–35.7)
MCV RBC AUTO: 95.4 FL (ref 79–97)
MONOCYTES # BLD AUTO: 0.69 10*3/MM3 (ref 0.1–0.9)
MONOCYTES NFR BLD AUTO: 12.1 % (ref 5–12)
NEUTROPHILS NFR BLD AUTO: 2.92 10*3/MM3 (ref 1.7–7)
NEUTROPHILS NFR BLD AUTO: 51.4 % (ref 42.7–76)
NRBC BLD AUTO-RTO: 0 /100 WBC (ref 0–0.2)
PLATELET # BLD AUTO: 160 10*3/MM3 (ref 140–450)
PMV BLD AUTO: 10.6 FL (ref 6–12)
POTASSIUM SERPL-SCNC: 4.4 MMOL/L (ref 3.5–5.2)
RBC # BLD AUTO: 4.09 10*6/MM3 (ref 4.14–5.8)
SODIUM SERPL-SCNC: 138 MMOL/L (ref 136–145)
WBC NRBC COR # BLD: 5.68 10*3/MM3 (ref 3.4–10.8)

## 2022-01-19 PROCEDURE — 99214 OFFICE O/P EST MOD 30 MIN: CPT | Performed by: NURSE PRACTITIONER

## 2022-01-19 PROCEDURE — 85025 COMPLETE CBC W/AUTO DIFF WBC: CPT

## 2022-01-19 PROCEDURE — 80048 BASIC METABOLIC PNL TOTAL CA: CPT

## 2022-01-19 PROCEDURE — 93000 ELECTROCARDIOGRAM COMPLETE: CPT | Performed by: NURSE PRACTITIONER

## 2022-01-19 PROCEDURE — 36415 COLL VENOUS BLD VENIPUNCTURE: CPT

## 2022-01-19 NOTE — PROGRESS NOTES
Date of Office Visit: 2022  Encounter Provider: NANCY Mccartney  Place of Service: [facility] CARDIOLOGY  Patient Name: Jayme Zaragoza  :1940    Chief Complaint   Patient presents with   • Rapid Heart Rate   • PSVT   :     HPI: Jayme Zaragoza is a 81 y.o. male who is a patient referred by Dr. Hardy for PSVT. He has a history of DVT x 2 (RLE & LUE on difference occasions) and is on chronic anticoagulation.    He initially saw Dr. Hardy in 2021 for complaints of intermittent dizziness, low energy and dyspnea. Had an echo which was normal.     He spend a lot of time in [place] and plays golf and had recurrent episodes of just not feeling well, low energy, some dizziness/near syncope, feeling like his legs were going to give out.     He saw Dr. Smyth, had a 48 hr monitor which showed frequent ectopic atrial beats, 12.7%, frequent episodes of SVT (140 episodes) with the longest being about 3 hours in duration and 6.7% PVCs.    He was started on beta-blocker and saw Dr. Hardy on .  He does not feel his heart racing he just has these vague symptoms of dizziness, low energy and near syncope which do seem to correlate with the times on his monitor.  He had a ZIO after being on beta-blocker which still shows several episodes of prolonged episodes of SVT. Referred to EP.     He continues to have the intermittent episodes of low energy, dizziness but not every day. He used the treadmill and light weights yesterday with no issues.     In reviewing his recent Zio---he had SVT  and he remembers that day--they were getting ready for a party and he remembers having the general symptoms so they do seem to correlate with his episodes of SVT but no palpitations or feelings of elevated HR and BB does not seem to be making much of a difference.     He has no chest pain, PND, orthopnea or edema.     He is on rivaroxaban for recurrent DVT's.        Past Medical History:    Diagnosis Date   • Anemia    • Anxiety    • Arthritis    • Cancer (HCC)     skin cancer   • Deep vein thrombosis (DVT) (HCC)     Left arm   • Deep vein thrombosis (HCC)     Right leg separate instance from arm   • History of hepatitis     Pt says type B, but possible type A. Resolved    • History of transfusion    • Hyperlipidemia    • PSVT (paroxysmal supraventricular tachycardia) (HCC)        Past Surgical History:   Procedure Laterality Date   • APPENDECTOMY     • COLONOSCOPY  approx 2016    negative per pt    • COLONOSCOPY N/A 2020    Procedure: COLONOSCOPY to cecum and TI with hot snare polypectomy;  Surgeon: Zach Lerner MD;  [facility]: [facility] ENDOSCOPY;  Service: Gastroenterology;  Laterality: N/A;  pre: occult blood in stool  Post: polyp, hemorrhoids   • EYE SURGERY Bilateral     Cataracks   • SKIN BIOPSY     • SKIN BIOPSY     • TONSILLECTOMY         Social History     Socioeconomic History   • Marital status:    Tobacco Use   • Smoking status: Former Smoker     Packs/day: 0.25     Years: 10.00     Pack years: 2.50     Types: Cigars, Cigarettes     Quit date: 1960     Years since quittin.7   • Smokeless tobacco: Never Used   Vaping Use   • Vaping Use: Never used   Substance and Sexual Activity   • Alcohol use: Yes     Alcohol/week: 28.0 - 35.0 standard drinks     Types: 28 - 35 Glasses of wine per week     Comment: now 1-2 glasses of wine per day ; caffeine use   • Drug use: No   • Sexual activity: Yes       Family History   Problem Relation Age of Onset   • Arthritis Mother    • Cancer Mother    • Alcohol abuse Brother    • Depression Brother    • Diabetes Brother    • Hypertension Brother    • Prostate cancer Brother    • Heart failure Father    • Diabetes Father        Review of Systems   Constitutional: Negative for chills, fever and malaise/fatigue.   Cardiovascular: Negative for chest pain, dyspnea on exertion, leg swelling, near-syncope, orthopnea, palpitations,  "paroxysmal nocturnal dyspnea and syncope.   Respiratory: Negative for cough and shortness of breath.    Musculoskeletal: Negative for joint pain, joint swelling and myalgias.   Gastrointestinal: Negative for abdominal pain, diarrhea, melena, nausea and vomiting.   Genitourinary: Negative for frequency and hematuria.   Neurological: Negative for light-headedness, numbness, paresthesias and seizures.   Allergic/Immunologic: Negative.    All other systems reviewed and are negative.      No Known Allergies      Current Outpatient Medications:   •  esomeprazole (NexIUM) 20 MG capsule, Take 20 mg by mouth every morning before breakfast., Disp: , Rfl:   •  metoprolol tartrate (LOPRESSOR) 25 MG tablet, Take 25 mg by mouth 2 (Two) Times a Day., Disp: , Rfl:   •  Multiple Vitamins-Minerals (CENTRUM SILVER ADULT 50+ PO), Take 1 tablet by mouth daily., Disp: , Rfl:   •  sertraline (ZOLOFT) 25 MG tablet, Take 25 mg by mouth daily., Disp: , Rfl:   •  simvastatin (ZOCOR) 10 MG tablet, Take 10 mg by mouth every night., Disp: , Rfl:   •  vitamin B-12 (CYANOCOBALAMIN) 100 MCG tablet, Take 50 mcg by mouth Daily., Disp: , Rfl:   •  vitamin C (ASCORBIC ACID) 250 MG tablet, Take 250 mg by mouth Daily., Disp: , Rfl:   •  XARELTO 10 MG tablet, Take 10 mg by mouth Daily., Disp: , Rfl:       Objective:     Vitals:    01/19/22 1000   BP: 140/90   Pulse: 56   Weight: 90.3 kg (199 lb)   Height: 182.9 cm (72\")     Body mass index is 26.99 kg/m².    PHYSICAL EXAM:    Vitals Reviewed.   General Appearance: No acute distress, well developed and well nourished.   Eyes: Conjunctiva and lids: No erythema, swelling, or discharge. Sclera non-icteric.   HENT: Atraumatic, normocephalic. External eyes, ears, and nose normal.   Respiratory: No signs of respiratory distress. Respiration rhythm and depth normal.   Clear to auscultation. No rales, crackles, rhonchi, or wheezing auscultated.   Cardiovascular:  Heart Rate and Rhythm: Normal, Heart Sounds: Normal " S1 and S2. No S3 or S4 noted.  Murmurs: No murmurs noted. No rubs, thrills, or gallops.   Lower Extremities: No edema noted.  Gastrointestinal:  Abdomen soft, non-distended, non-tender.   Musculoskeletal: Normal movement of extremities  Skin: Warm and dry.   Psychiatric: Patient alert and oriented to person, place, and time. Speech and behavior appropriate. Normal mood and affect.       ECG 12 Lead    Date/Time: 1/19/2022 10:04 AM  Performed by: Fauzia Marina APRN  Authorized by: Fauzia Marina APRN   Comparison: compared with previous ECG   Similar to previous ECG  Rhythm: sinus rhythm  BPM: 56                Assessment:       Diagnosis Plan   1. Paroxysmal SVT (supraventricular tachycardia) (HCC)  ECG 12 Lead    Case Request EP Lab: Ablation SVT          Plan:       1. Symptomatic PSVT---some episodes hours in duration.     Dr. Conway and I both saw patient, reviewed both recent monitors. He has prolonged episodes of PSVT---not responsive to BB, recommend ablation. Discussed risks and benefits and he is agreeable to proceed. Orders placed, will get it scheduled.     As always, it has been a pleasure to participate in your patient's care.      Sincerely,         ANNCY Lang

## 2022-01-21 ENCOUNTER — TRANSCRIBE ORDERS (OUTPATIENT)
Dept: ADMINISTRATIVE | Facility: HOSPITAL | Age: 82
End: 2022-01-21

## 2022-01-21 DIAGNOSIS — Z01.818 OTHER SPECIFIED PRE-OPERATIVE EXAMINATION: Primary | ICD-10-CM

## 2022-01-29 ENCOUNTER — LAB (OUTPATIENT)
Dept: LAB | Facility: HOSPITAL | Age: 82
End: 2022-01-29

## 2022-01-29 DIAGNOSIS — Z01.818 OTHER SPECIFIED PRE-OPERATIVE EXAMINATION: ICD-10-CM

## 2022-01-29 LAB — SARS-COV-2 ORF1AB RESP QL NAA+PROBE: NOT DETECTED

## 2022-01-29 PROCEDURE — C9803 HOPD COVID-19 SPEC COLLECT: HCPCS

## 2022-01-29 PROCEDURE — U0004 COV-19 TEST NON-CDC HGH THRU: HCPCS

## 2022-01-29 PROCEDURE — U0005 INFEC AGEN DETEC AMPLI PROBE: HCPCS

## 2022-02-01 ENCOUNTER — HOSPITAL ENCOUNTER (OUTPATIENT)
Facility: HOSPITAL | Age: 82
Setting detail: HOSPITAL OUTPATIENT SURGERY
Discharge: HOME | End: 2022-02-01
Attending: INTERNAL MEDICINE | Admitting: NURSE PRACTITIONER

## 2022-02-01 VITALS
OXYGEN SATURATION: 100 % | DIASTOLIC BLOOD PRESSURE: 72 MMHG | HEIGHT: 72 IN | WEIGHT: 193 LBS | SYSTOLIC BLOOD PRESSURE: 114 MMHG | RESPIRATION RATE: 16 BRPM | HEART RATE: 71 BPM | TEMPERATURE: 98.1 F | BODY MASS INDEX: 26.14 KG/M2

## 2022-02-01 DIAGNOSIS — I47.1 PAROXYSMAL SVT (SUPRAVENTRICULAR TACHYCARDIA): ICD-10-CM

## 2022-02-01 LAB — QT INTERVAL: 423 MS

## 2022-02-01 PROCEDURE — C1730 CATH, EP, 19 OR FEW ELECT: HCPCS | Performed by: INTERNAL MEDICINE

## 2022-02-01 PROCEDURE — C1732 CATH, EP, DIAG/ABL, 3D/VECT: HCPCS | Performed by: INTERNAL MEDICINE

## 2022-02-01 PROCEDURE — C1893 INTRO/SHEATH, FIXED,NON-PEEL: HCPCS | Performed by: INTERNAL MEDICINE

## 2022-02-01 PROCEDURE — 93623 PRGRMD STIMJ&PACG IV RX NFS: CPT | Performed by: INTERNAL MEDICINE

## 2022-02-01 PROCEDURE — C1894 INTRO/SHEATH, NON-LASER: HCPCS | Performed by: INTERNAL MEDICINE

## 2022-02-01 PROCEDURE — 93653 COMPRE EP EVAL TX SVT: CPT | Performed by: INTERNAL MEDICINE

## 2022-02-01 PROCEDURE — 93010 ELECTROCARDIOGRAM REPORT: CPT | Performed by: INTERNAL MEDICINE

## 2022-02-01 PROCEDURE — 99153 MOD SED SAME PHYS/QHP EA: CPT | Performed by: INTERNAL MEDICINE

## 2022-02-01 PROCEDURE — 99152 MOD SED SAME PHYS/QHP 5/>YRS: CPT | Performed by: INTERNAL MEDICINE

## 2022-02-01 PROCEDURE — 25010000002 FENTANYL CITRATE (PF) 50 MCG/ML SOLUTION: Performed by: INTERNAL MEDICINE

## 2022-02-01 PROCEDURE — 93005 ELECTROCARDIOGRAM TRACING: CPT | Performed by: INTERNAL MEDICINE

## 2022-02-01 PROCEDURE — 25010000002 MIDAZOLAM PER 1 MG: Performed by: INTERNAL MEDICINE

## 2022-02-01 RX ORDER — MIDAZOLAM HYDROCHLORIDE 1 MG/ML
INJECTION INTRAMUSCULAR; INTRAVENOUS AS NEEDED
Status: DISCONTINUED | OUTPATIENT
Start: 2022-02-01 | End: 2022-02-01 | Stop reason: HOSPADM

## 2022-02-01 RX ORDER — FENTANYL CITRATE 50 UG/ML
INJECTION, SOLUTION INTRAMUSCULAR; INTRAVENOUS AS NEEDED
Status: DISCONTINUED | OUTPATIENT
Start: 2022-02-01 | End: 2022-02-01 | Stop reason: HOSPADM

## 2022-02-01 RX ORDER — SODIUM CHLORIDE 9 MG/ML
75 INJECTION, SOLUTION INTRAVENOUS CONTINUOUS
Status: DISCONTINUED | OUTPATIENT
Start: 2022-02-01 | End: 2022-02-01 | Stop reason: HOSPADM

## 2022-02-01 RX ORDER — LIDOCAINE HYDROCHLORIDE AND EPINEPHRINE 10; 10 MG/ML; UG/ML
INJECTION, SOLUTION INFILTRATION; PERINEURAL AS NEEDED
Status: DISCONTINUED | OUTPATIENT
Start: 2022-02-01 | End: 2022-02-01 | Stop reason: HOSPADM

## 2022-02-01 RX ORDER — SODIUM CHLORIDE 0.9 % (FLUSH) 0.9 %
3 SYRINGE (ML) INJECTION EVERY 12 HOURS SCHEDULED
Status: DISCONTINUED | OUTPATIENT
Start: 2022-02-01 | End: 2022-02-01 | Stop reason: HOSPADM

## 2022-02-01 RX ORDER — NALOXONE HCL 0.4 MG/ML
0.4 VIAL (ML) INJECTION
Status: DISCONTINUED | OUTPATIENT
Start: 2022-02-01 | End: 2022-02-01 | Stop reason: HOSPADM

## 2022-02-01 RX ORDER — SODIUM CHLORIDE 0.9 % (FLUSH) 0.9 %
10 SYRINGE (ML) INJECTION AS NEEDED
Status: DISCONTINUED | OUTPATIENT
Start: 2022-02-01 | End: 2022-02-01 | Stop reason: HOSPADM

## 2022-02-01 RX ORDER — HYDROMORPHONE HYDROCHLORIDE 1 MG/ML
0.5 INJECTION, SOLUTION INTRAMUSCULAR; INTRAVENOUS; SUBCUTANEOUS
Status: DISCONTINUED | OUTPATIENT
Start: 2022-02-01 | End: 2022-02-01 | Stop reason: HOSPADM

## 2022-02-01 RX ADMIN — SODIUM CHLORIDE 75 ML/HR: 9 INJECTION, SOLUTION INTRAVENOUS at 07:59

## 2022-02-01 NOTE — DISCHARGE INSTRUCTIONS
[facility]  Cardiology  [address] [place] [address]  [phone]    Coronary Ablation After Care    Refer to this sheet in the next few weeks. These instructions provide you with information on caring for yourself after your procedure. Your health care provider may also give you more specific instructions. Your treatment has been planned according to current medical practices, but problems sometimes occur. Call your health care provider if you have any problems or questions after your procedure.      What to Expect After the Procedure:  After your procedure, it is typical to have the following sensations:  · Minor discomfort or tenderness and a small bump at the catheter insertion site(s). The bump(s) should usually decrease in size and tenderness within 1 to 2 weeks.  · Any bruising will usually fade within 2 to 4 weeks.  Home Care Instructions:  · Do not apply powder or lotion to the site.  · Do not take baths, swim, or use a hot tub until your health care provider approves and the site is completely healed.  · Do not bend, squat, or lift anything over 20 lb (9 kg) or as directed by your health care provider. However, we recommend lifting nothing heavier than a gallon of milk.    · You may shower 24 hours after the procedure. Remove the bandage (dressing) and gently wash the site with plain soap and water. Gently pat the site dry. You may apply a band aid daily for 2 days if desired.    · Inspect the site at least twice daily.  · Increase your fluid intake for the next 2 days.    · Limit your activity for the first 48 hours.   · Avoid strenuous activity for 1 week or as advised by your physician.    · Follow instructions about when you can drive or return to work as directed by your physician.    · Hold direct pressure over the site when you cough, sneeze, laugh or change positions.  Do this for the next 2 days.    Call Your Doctor If:  · You have drainage (other than a small amount of  blood on the dressing).  · You have chills or a fever > 101.  · You have redness, warmth, swelling (larger than a walnut), or pain at the insertion   · You develop palpitations, chest pain or shortness of breath, feel faint, or pass out.  · You develop pain, discoloration, coldness, numbness, tingling, or severe bruising in the leg that held the catheter.  · You develop bleeding from any other place, such as the bowels.  · You have heavy bleeding from the site.  If this happens, hold pressure on the site and call 911.        Make Sure You:  · Understand these instructions.  · Will watch your condition.  · Will get help right away if you are not doing well or get worse.

## 2022-02-01 NOTE — INTERVAL H&P NOTE
H&P reviewed. The patient was examined and there are no changes to the H&P.  Risks discussed

## 2022-02-02 LAB — QT INTERVAL: 413 MS

## 2022-02-05 ENCOUNTER — HOSPITAL ENCOUNTER (OUTPATIENT)
Facility: HOSPITAL | Age: 82
Setting detail: OBSERVATION
Discharge: HOME | End: 2022-02-06
Attending: EMERGENCY MEDICINE | Admitting: INTERNAL MEDICINE

## 2022-02-05 ENCOUNTER — APPOINTMENT (OUTPATIENT)
Dept: GENERAL RADIOLOGY | Facility: HOSPITAL | Age: 82
End: 2022-02-05

## 2022-02-05 DIAGNOSIS — R07.9 CHEST PAIN, UNSPECIFIED TYPE: Primary | ICD-10-CM

## 2022-02-05 LAB
ALBUMIN SERPL-MCNC: 3.9 G/DL (ref 3.5–5.2)
ALBUMIN/GLOB SERPL: 1.6 G/DL
ALP SERPL-CCNC: 57 U/L (ref 39–117)
ALT SERPL W P-5'-P-CCNC: 15 U/L (ref 1–41)
ANION GAP SERPL CALCULATED.3IONS-SCNC: 11.2 MMOL/L (ref 5–15)
AST SERPL-CCNC: 19 U/L (ref 1–40)
BASOPHILS # BLD AUTO: 0.03 10*3/MM3 (ref 0–0.2)
BASOPHILS NFR BLD AUTO: 0.3 % (ref 0–1.5)
BILIRUB SERPL-MCNC: 0.2 MG/DL (ref 0–1.2)
BUN SERPL-MCNC: 18 MG/DL (ref 8–23)
BUN/CREAT SERPL: 16.1 (ref 7–25)
CALCIUM SPEC-SCNC: 8.6 MG/DL (ref 8.6–10.5)
CHLORIDE SERPL-SCNC: 105 MMOL/L (ref 98–107)
CO2 SERPL-SCNC: 22.8 MMOL/L (ref 22–29)
CREAT SERPL-MCNC: 1.12 MG/DL (ref 0.76–1.27)
DEPRECATED RDW RBC AUTO: 44.5 FL (ref 37–54)
EOSINOPHIL # BLD AUTO: 0.07 10*3/MM3 (ref 0–0.4)
EOSINOPHIL NFR BLD AUTO: 0.6 % (ref 0.3–6.2)
ERYTHROCYTE [DISTWIDTH] IN BLOOD BY AUTOMATED COUNT: 13 % (ref 12.3–15.4)
GFR SERPL CREATININE-BSD FRML MDRD: 63 ML/MIN/1.73
GLOBULIN UR ELPH-MCNC: 2.5 GM/DL
GLUCOSE SERPL-MCNC: 126 MG/DL (ref 65–99)
HCT VFR BLD AUTO: 34.6 % (ref 37.5–51)
HGB BLD-MCNC: 12.3 G/DL (ref 13–17.7)
IMM GRANULOCYTES # BLD AUTO: 0.03 10*3/MM3 (ref 0–0.05)
IMM GRANULOCYTES NFR BLD AUTO: 0.3 % (ref 0–0.5)
INR PPP: 1.27 (ref 0.9–1.1)
LYMPHOCYTES # BLD AUTO: 1.06 10*3/MM3 (ref 0.7–3.1)
LYMPHOCYTES NFR BLD AUTO: 9.7 % (ref 19.6–45.3)
MCH RBC QN AUTO: 33.7 PG (ref 26.6–33)
MCHC RBC AUTO-ENTMCNC: 35.5 G/DL (ref 31.5–35.7)
MCV RBC AUTO: 94.8 FL (ref 79–97)
MONOCYTES # BLD AUTO: 0.99 10*3/MM3 (ref 0.1–0.9)
MONOCYTES NFR BLD AUTO: 9.1 % (ref 5–12)
NEUTROPHILS NFR BLD AUTO: 8.71 10*3/MM3 (ref 1.7–7)
NEUTROPHILS NFR BLD AUTO: 80 % (ref 42.7–76)
NRBC BLD AUTO-RTO: 0 /100 WBC (ref 0–0.2)
PLATELET # BLD AUTO: 139 10*3/MM3 (ref 140–450)
PMV BLD AUTO: 10.7 FL (ref 6–12)
POTASSIUM SERPL-SCNC: 4.1 MMOL/L (ref 3.5–5.2)
PROT SERPL-MCNC: 6.4 G/DL (ref 6–8.5)
PROTHROMBIN TIME: 15.9 SECONDS (ref 11.7–14.2)
RBC # BLD AUTO: 3.65 10*6/MM3 (ref 4.14–5.8)
SODIUM SERPL-SCNC: 139 MMOL/L (ref 136–145)
TROPONIN T SERPL-MCNC: <0.01 NG/ML (ref 0–0.03)
WBC NRBC COR # BLD: 10.89 10*3/MM3 (ref 3.4–10.8)

## 2022-02-05 PROCEDURE — 85610 PROTHROMBIN TIME: CPT | Performed by: EMERGENCY MEDICINE

## 2022-02-05 PROCEDURE — 84484 ASSAY OF TROPONIN QUANT: CPT | Performed by: EMERGENCY MEDICINE

## 2022-02-05 PROCEDURE — 96375 TX/PRO/DX INJ NEW DRUG ADDON: CPT

## 2022-02-05 PROCEDURE — 93010 ELECTROCARDIOGRAM REPORT: CPT | Performed by: INTERNAL MEDICINE

## 2022-02-05 PROCEDURE — 85025 COMPLETE CBC W/AUTO DIFF WBC: CPT | Performed by: EMERGENCY MEDICINE

## 2022-02-05 PROCEDURE — 80053 COMPREHEN METABOLIC PANEL: CPT | Performed by: EMERGENCY MEDICINE

## 2022-02-05 PROCEDURE — 96374 THER/PROPH/DIAG INJ IV PUSH: CPT

## 2022-02-05 PROCEDURE — 96376 TX/PRO/DX INJ SAME DRUG ADON: CPT

## 2022-02-05 PROCEDURE — 71045 X-RAY EXAM CHEST 1 VIEW: CPT

## 2022-02-05 PROCEDURE — 36415 COLL VENOUS BLD VENIPUNCTURE: CPT

## 2022-02-05 PROCEDURE — 99284 EMERGENCY DEPT VISIT MOD MDM: CPT

## 2022-02-05 PROCEDURE — 93005 ELECTROCARDIOGRAM TRACING: CPT

## 2022-02-05 PROCEDURE — 93005 ELECTROCARDIOGRAM TRACING: CPT | Performed by: EMERGENCY MEDICINE

## 2022-02-05 PROCEDURE — 83880 ASSAY OF NATRIURETIC PEPTIDE: CPT | Performed by: EMERGENCY MEDICINE

## 2022-02-05 RX ORDER — SODIUM CHLORIDE 0.9 % (FLUSH) 0.9 %
10 SYRINGE (ML) INJECTION AS NEEDED
Status: DISCONTINUED | OUTPATIENT
Start: 2022-02-05 | End: 2022-02-06 | Stop reason: HOSPADM

## 2022-02-06 ENCOUNTER — APPOINTMENT (OUTPATIENT)
Dept: NUCLEAR MEDICINE | Facility: HOSPITAL | Age: 82
End: 2022-02-06

## 2022-02-06 ENCOUNTER — APPOINTMENT (OUTPATIENT)
Dept: CT IMAGING | Facility: HOSPITAL | Age: 82
End: 2022-02-06

## 2022-02-06 ENCOUNTER — APPOINTMENT (OUTPATIENT)
Dept: CARDIOLOGY | Facility: HOSPITAL | Age: 82
End: 2022-02-06

## 2022-02-06 VITALS
SYSTOLIC BLOOD PRESSURE: 126 MMHG | BODY MASS INDEX: 26.28 KG/M2 | HEIGHT: 72 IN | WEIGHT: 194 LBS | DIASTOLIC BLOOD PRESSURE: 64 MMHG | RESPIRATION RATE: 18 BRPM | HEART RATE: 86 BPM | OXYGEN SATURATION: 95 % | TEMPERATURE: 97.6 F

## 2022-02-06 PROBLEM — R07.9 CHEST PAIN: Status: ACTIVE | Noted: 2022-02-06

## 2022-02-06 LAB
ANION GAP SERPL CALCULATED.3IONS-SCNC: 8.4 MMOL/L (ref 5–15)
AORTIC DIMENSIONLESS INDEX: 0.6 (DI)
ASCENDING AORTA: 3.4 CM
BH CV ECHO MEAS - AO MAX PG (FULL): 4.4 MMHG
BH CV ECHO MEAS - AO MAX PG: 7.1 MMHG
BH CV ECHO MEAS - AO MEAN PG (FULL): 2.2 MMHG
BH CV ECHO MEAS - AO MEAN PG: 3.7 MMHG
BH CV ECHO MEAS - AO V2 MAX: 133.3 CM/SEC
BH CV ECHO MEAS - AO V2 MEAN: 91.3 CM/SEC
BH CV ECHO MEAS - AO V2 VTI: 28.9 CM
BH CV ECHO MEAS - ASC AORTA: 3.4 CM
BH CV ECHO MEAS - AVA(I,A): 2.2 CM^2
BH CV ECHO MEAS - AVA(I,D): 2.2 CM^2
BH CV ECHO MEAS - AVA(V,A): 2.2 CM^2
BH CV ECHO MEAS - AVA(V,D): 2.2 CM^2
BH CV ECHO MEAS - BSA(HAYCOCK): 2.1 M^2
BH CV ECHO MEAS - BSA: 2.1 M^2
BH CV ECHO MEAS - BZI_BMI: 26.3 KILOGRAMS/M^2
BH CV ECHO MEAS - BZI_METRIC_HEIGHT: 182.9 CM
BH CV ECHO MEAS - BZI_METRIC_WEIGHT: 88 KG
BH CV ECHO MEAS - EDV(CUBED): 135.5 ML
BH CV ECHO MEAS - EDV(MOD-SP2): 140 ML
BH CV ECHO MEAS - EDV(MOD-SP4): 109 ML
BH CV ECHO MEAS - EDV(TEICH): 125.8 ML
BH CV ECHO MEAS - EF(CUBED): 64.4 %
BH CV ECHO MEAS - EF(MOD-BP): 60.5 %
BH CV ECHO MEAS - EF(MOD-SP2): 62.1 %
BH CV ECHO MEAS - EF(MOD-SP4): 61.5 %
BH CV ECHO MEAS - EF(TEICH): 55.6 %
BH CV ECHO MEAS - ESV(CUBED): 48.3 ML
BH CV ECHO MEAS - ESV(MOD-SP2): 53 ML
BH CV ECHO MEAS - ESV(MOD-SP4): 42 ML
BH CV ECHO MEAS - ESV(TEICH): 55.9 ML
BH CV ECHO MEAS - FS: 29.1 %
BH CV ECHO MEAS - IVS/LVPW: 0.94
BH CV ECHO MEAS - IVSD: 0.78 CM
BH CV ECHO MEAS - LAT PEAK E' VEL: 8.9 CM/SEC
BH CV ECHO MEAS - LV DIASTOLIC VOL/BSA (35-75): 51.8 ML/M^2
BH CV ECHO MEAS - LV MASS(C)D: 144.4 GRAMS
BH CV ECHO MEAS - LV MASS(C)DI: 68.7 GRAMS/M^2
BH CV ECHO MEAS - LV MAX PG: 2.8 MMHG
BH CV ECHO MEAS - LV MEAN PG: 1.5 MMHG
BH CV ECHO MEAS - LV SYSTOLIC VOL/BSA (12-30): 20 ML/M^2
BH CV ECHO MEAS - LV V1 MAX: 83 CM/SEC
BH CV ECHO MEAS - LV V1 MEAN: 58 CM/SEC
BH CV ECHO MEAS - LV V1 VTI: 18.5 CM
BH CV ECHO MEAS - LVIDD: 5.1 CM
BH CV ECHO MEAS - LVIDS: 3.6 CM
BH CV ECHO MEAS - LVLD AP2: 8.6 CM
BH CV ECHO MEAS - LVLD AP4: 8.1 CM
BH CV ECHO MEAS - LVLS AP2: 7.3 CM
BH CV ECHO MEAS - LVLS AP4: 6.5 CM
BH CV ECHO MEAS - LVOT AREA (M): 3.5 CM^2
BH CV ECHO MEAS - LVOT AREA: 3.5 CM^2
BH CV ECHO MEAS - LVOT DIAM: 2.1 CM
BH CV ECHO MEAS - LVPWD: 0.84 CM
BH CV ECHO MEAS - MED PEAK E' VEL: 6.7 CM/SEC
BH CV ECHO MEAS - MV A DUR: 0.16 SEC
BH CV ECHO MEAS - MV A MAX VEL: 73.3 CM/SEC
BH CV ECHO MEAS - MV DEC SLOPE: 378.6 CM/SEC^2
BH CV ECHO MEAS - MV DEC TIME: 201 SEC
BH CV ECHO MEAS - MV E MAX VEL: 77.6 CM/SEC
BH CV ECHO MEAS - MV E/A: 1.1
BH CV ECHO MEAS - MV MAX PG: 2.3 MMHG
BH CV ECHO MEAS - MV MEAN PG: 0.97 MMHG
BH CV ECHO MEAS - MV P1/2T MAX VEL: 77.2 CM/SEC
BH CV ECHO MEAS - MV P1/2T: 59.7 MSEC
BH CV ECHO MEAS - MV V2 MAX: 75.3 CM/SEC
BH CV ECHO MEAS - MV V2 MEAN: 44.3 CM/SEC
BH CV ECHO MEAS - MV V2 VTI: 20.9 CM
BH CV ECHO MEAS - MVA P1/2T LCG: 2.8 CM^2
BH CV ECHO MEAS - MVA(P1/2T): 3.7 CM^2
BH CV ECHO MEAS - MVA(VTI): 3.1 CM^2
BH CV ECHO MEAS - PA ACC TIME: 0.08 SEC
BH CV ECHO MEAS - PA MAX PG (FULL): 1.5 MMHG
BH CV ECHO MEAS - PA MAX PG: 2.4 MMHG
BH CV ECHO MEAS - PA MEAN PG (FULL): 0.62 MMHG
BH CV ECHO MEAS - PA MEAN PG: 1.1 MMHG
BH CV ECHO MEAS - PA PR(ACCEL): 43.3 MMHG
BH CV ECHO MEAS - PA V2 MAX: 76.7 CM/SEC
BH CV ECHO MEAS - PA V2 MEAN: 48.6 CM/SEC
BH CV ECHO MEAS - PA V2 VTI: 15.8 CM
BH CV ECHO MEAS - PULM A REVS DUR: 0.13 SEC
BH CV ECHO MEAS - PULM A REVS VEL: 31.2 CM/SEC
BH CV ECHO MEAS - PULM DIAS VEL: 44.2 CM/SEC
BH CV ECHO MEAS - PULM S/D: 1.4
BH CV ECHO MEAS - PULM SYS VEL: 63.8 CM/SEC
BH CV ECHO MEAS - PVA(I,A): 3.7 CM^2
BH CV ECHO MEAS - PVA(I,D): 3.7 CM^2
BH CV ECHO MEAS - PVA(V,A): 3.4 CM^2
BH CV ECHO MEAS - PVA(V,D): 3.4 CM^2
BH CV ECHO MEAS - QP/QS: 0.9
BH CV ECHO MEAS - RAP SYSTOLE: 8 MMHG
BH CV ECHO MEAS - RV MAX PG: 0.84 MMHG
BH CV ECHO MEAS - RV MEAN PG: 0.46 MMHG
BH CV ECHO MEAS - RV V1 MAX: 45.8 CM/SEC
BH CV ECHO MEAS - RV V1 MEAN: 32.2 CM/SEC
BH CV ECHO MEAS - RV V1 VTI: 10.1 CM
BH CV ECHO MEAS - RVOT AREA: 5.7 CM^2
BH CV ECHO MEAS - RVOT DIAM: 2.7 CM
BH CV ECHO MEAS - RVSP: 34.3 MMHG
BH CV ECHO MEAS - SI(CUBED): 41.5 ML/M^2
BH CV ECHO MEAS - SI(LVOT): 30.7 ML/M^2
BH CV ECHO MEAS - SI(MOD-SP2): 41.4 ML/M^2
BH CV ECHO MEAS - SI(MOD-SP4): 31.9 ML/M^2
BH CV ECHO MEAS - SI(TEICH): 33.2 ML/M^2
BH CV ECHO MEAS - SV(CUBED): 87.2 ML
BH CV ECHO MEAS - SV(LVOT): 64.6 ML
BH CV ECHO MEAS - SV(MOD-SP2): 87 ML
BH CV ECHO MEAS - SV(MOD-SP4): 67 ML
BH CV ECHO MEAS - SV(RVOT): 58 ML
BH CV ECHO MEAS - SV(TEICH): 69.9 ML
BH CV ECHO MEAS - TAPSE (>1.6): 2.6 CM
BH CV ECHO MEAS - TR MAX VEL: 256.5 CM/SEC
BH CV ECHO MEASUREMENTS AVERAGE E/E' RATIO: 9.95
BH CV NUCLEAR PRIOR STUDY: 3
BH CV REST NUCLEAR ISOTOPE DOSE: 8 MCI
BH CV STRESS BP STAGE 1: NORMAL
BH CV STRESS BP STAGE 2: NORMAL
BH CV STRESS DURATION MIN STAGE 1: 3
BH CV STRESS DURATION MIN STAGE 2: 3
BH CV STRESS DURATION SEC STAGE 1: 0
BH CV STRESS DURATION SEC STAGE 2: 0
BH CV STRESS GRADE STAGE 1: 10
BH CV STRESS GRADE STAGE 2: 12
BH CV STRESS HR STAGE 1: 112
BH CV STRESS HR STAGE 2: 118
BH CV STRESS METS STAGE 1: 5
BH CV STRESS METS STAGE 2: 7.5
BH CV STRESS NUCLEAR ISOTOPE DOSE: 27.5 MCI
BH CV STRESS PROTOCOL 1: NORMAL
BH CV STRESS RECOVERY BP: NORMAL MMHG
BH CV STRESS RECOVERY HR: 82 BPM
BH CV STRESS SPEED STAGE 1: 1.7
BH CV STRESS SPEED STAGE 2: 2.5
BH CV STRESS STAGE 1: 1
BH CV STRESS STAGE 2: 2
BH CV VAS BP RIGHT ARM: NORMAL MMHG
BH CV XLRA - RV BASE: 3.9 CM
BH CV XLRA - RV LENGTH: 8.3 CM
BH CV XLRA - RV MID: 2.6 CM
BH CV XLRA - TDI S': 12.3 CM/SEC
BUN SERPL-MCNC: 16 MG/DL (ref 8–23)
BUN/CREAT SERPL: 16.7 (ref 7–25)
CALCIUM SPEC-SCNC: 8 MG/DL (ref 8.6–10.5)
CHLORIDE SERPL-SCNC: 107 MMOL/L (ref 98–107)
CO2 SERPL-SCNC: 22.6 MMOL/L (ref 22–29)
CREAT SERPL-MCNC: 0.96 MG/DL (ref 0.76–1.27)
DEPRECATED RDW RBC AUTO: 44 FL (ref 37–54)
ERYTHROCYTE [DISTWIDTH] IN BLOOD BY AUTOMATED COUNT: 13 % (ref 12.3–15.4)
GFR SERPL CREATININE-BSD FRML MDRD: 75 ML/MIN/1.73
GLUCOSE SERPL-MCNC: 130 MG/DL (ref 65–99)
HCT VFR BLD AUTO: 32.1 % (ref 37.5–51)
HGB BLD-MCNC: 11.4 G/DL (ref 13–17.7)
HOLD SPECIMEN: NORMAL
HOLD SPECIMEN: NORMAL
LEFT ATRIUM VOLUME INDEX: 31 ML/M2
LEFT ATRIUM VOLUME: 64 CM3
LV EF NUC BP: 60 %
MAXIMAL PREDICTED HEART RATE: 137 BPM
MAXIMAL PREDICTED HEART RATE: 137 BPM
MCH RBC QN AUTO: 33.3 PG (ref 26.6–33)
MCHC RBC AUTO-ENTMCNC: 35.5 G/DL (ref 31.5–35.7)
MCV RBC AUTO: 93.9 FL (ref 79–97)
NT-PROBNP SERPL-MCNC: 300 PG/ML (ref 0–1800)
PERCENT MAX PREDICTED HR: 86.13 %
PLATELET # BLD AUTO: 120 10*3/MM3 (ref 140–450)
PMV BLD AUTO: 10.6 FL (ref 6–12)
POTASSIUM SERPL-SCNC: 4 MMOL/L (ref 3.5–5.2)
QT INTERVAL: 387 MS
QT INTERVAL: 395 MS
RBC # BLD AUTO: 3.42 10*6/MM3 (ref 4.14–5.8)
SARS-COV-2 RNA RESP QL NAA+PROBE: NOT DETECTED
SINUS: 3.8 CM
SODIUM SERPL-SCNC: 138 MMOL/L (ref 136–145)
STJ: 2.8 CM
STRESS BASELINE BP: NORMAL MMHG
STRESS BASELINE HR: 78 BPM
STRESS PERCENT HR: 101 %
STRESS POST ESTIMATED WORKLOAD: 7.1 METS
STRESS POST EXERCISE DUR MIN: 5 MIN
STRESS POST EXERCISE DUR SEC: 30 SEC
STRESS POST PEAK BP: NORMAL MMHG
STRESS POST PEAK HR: 118 BPM
STRESS TARGET HR: 116 BPM
STRESS TARGET HR: 116 BPM
TROPONIN T SERPL-MCNC: <0.01 NG/ML (ref 0–0.03)
TROPONIN T SERPL-MCNC: <0.01 NG/ML (ref 0–0.03)
WBC NRBC COR # BLD: 10.05 10*3/MM3 (ref 3.4–10.8)
WHOLE BLOOD HOLD SPECIMEN: NORMAL
WHOLE BLOOD HOLD SPECIMEN: NORMAL

## 2022-02-06 PROCEDURE — 93017 CV STRESS TEST TRACING ONLY: CPT

## 2022-02-06 PROCEDURE — 25010000002 PERFLUTREN (DEFINITY) 8.476 MG IN SODIUM CHLORIDE (PF) 0.9 % 10 ML INJECTION: Performed by: PHYSICIAN ASSISTANT

## 2022-02-06 PROCEDURE — 93010 ELECTROCARDIOGRAM REPORT: CPT | Performed by: INTERNAL MEDICINE

## 2022-02-06 PROCEDURE — 71275 CT ANGIOGRAPHY CHEST: CPT

## 2022-02-06 PROCEDURE — 84484 ASSAY OF TROPONIN QUANT: CPT | Performed by: EMERGENCY MEDICINE

## 2022-02-06 PROCEDURE — U0005 INFEC AGEN DETEC AMPLI PROBE: HCPCS | Performed by: EMERGENCY MEDICINE

## 2022-02-06 PROCEDURE — G0378 HOSPITAL OBSERVATION PER HR: HCPCS

## 2022-02-06 PROCEDURE — 0 IOPAMIDOL PER 1 ML: Performed by: EMERGENCY MEDICINE

## 2022-02-06 PROCEDURE — 93005 ELECTROCARDIOGRAM TRACING: CPT | Performed by: PHYSICIAN ASSISTANT

## 2022-02-06 PROCEDURE — 84484 ASSAY OF TROPONIN QUANT: CPT | Performed by: PHYSICIAN ASSISTANT

## 2022-02-06 PROCEDURE — 78452 HT MUSCLE IMAGE SPECT MULT: CPT | Performed by: INTERNAL MEDICINE

## 2022-02-06 PROCEDURE — 0 TECHNETIUM SESTAMIBI: Performed by: EMERGENCY MEDICINE

## 2022-02-06 PROCEDURE — 93306 TTE W/DOPPLER COMPLETE: CPT

## 2022-02-06 PROCEDURE — 78452 HT MUSCLE IMAGE SPECT MULT: CPT

## 2022-02-06 PROCEDURE — 93306 TTE W/DOPPLER COMPLETE: CPT | Performed by: INTERNAL MEDICINE

## 2022-02-06 PROCEDURE — 96375 TX/PRO/DX INJ NEW DRUG ADDON: CPT

## 2022-02-06 PROCEDURE — 80048 BASIC METABOLIC PNL TOTAL CA: CPT | Performed by: PHYSICIAN ASSISTANT

## 2022-02-06 PROCEDURE — 99213 OFFICE O/P EST LOW 20 MIN: CPT | Performed by: INTERNAL MEDICINE

## 2022-02-06 PROCEDURE — 93016 CV STRESS TEST SUPVJ ONLY: CPT | Performed by: INTERNAL MEDICINE

## 2022-02-06 PROCEDURE — 25010000002 MORPHINE PER 10 MG: Performed by: EMERGENCY MEDICINE

## 2022-02-06 PROCEDURE — 93018 CV STRESS TEST I&R ONLY: CPT | Performed by: INTERNAL MEDICINE

## 2022-02-06 PROCEDURE — 85027 COMPLETE CBC AUTOMATED: CPT | Performed by: PHYSICIAN ASSISTANT

## 2022-02-06 PROCEDURE — U0003 INFECTIOUS AGENT DETECTION BY NUCLEIC ACID (DNA OR RNA); SEVERE ACUTE RESPIRATORY SYNDROME CORONAVIRUS 2 (SARS-COV-2) (CORONAVIRUS DISEASE [COVID-19]), AMPLIFIED PROBE TECHNIQUE, MAKING USE OF HIGH THROUGHPUT TECHNOLOGIES AS DESCRIBED BY CMS-2020-01-R: HCPCS | Performed by: EMERGENCY MEDICINE

## 2022-02-06 PROCEDURE — A9500 TC99M SESTAMIBI: HCPCS | Performed by: EMERGENCY MEDICINE

## 2022-02-06 RX ORDER — ATORVASTATIN CALCIUM 10 MG/1
10 TABLET, FILM COATED ORAL DAILY
Status: DISCONTINUED | OUTPATIENT
Start: 2022-02-06 | End: 2022-02-06 | Stop reason: HOSPADM

## 2022-02-06 RX ORDER — UBIDECARENONE 75 MG
50 CAPSULE ORAL DAILY
Status: DISCONTINUED | OUTPATIENT
Start: 2022-02-06 | End: 2022-02-06 | Stop reason: HOSPADM

## 2022-02-06 RX ORDER — ONDANSETRON 2 MG/ML
4 INJECTION INTRAMUSCULAR; INTRAVENOUS EVERY 6 HOURS PRN
Status: DISCONTINUED | OUTPATIENT
Start: 2022-02-06 | End: 2022-02-06 | Stop reason: HOSPADM

## 2022-02-06 RX ORDER — MORPHINE SULFATE 2 MG/ML
2 INJECTION, SOLUTION INTRAMUSCULAR; INTRAVENOUS ONCE
Status: COMPLETED | OUTPATIENT
Start: 2022-02-06 | End: 2022-02-06

## 2022-02-06 RX ORDER — MULTIPLE VITAMINS W/ MINERALS TAB 9MG-400MCG
1 TAB ORAL DAILY
Status: DISCONTINUED | OUTPATIENT
Start: 2022-02-06 | End: 2022-02-06 | Stop reason: HOSPADM

## 2022-02-06 RX ORDER — ASCORBIC ACID 500 MG
250 TABLET ORAL DAILY
Status: DISCONTINUED | OUTPATIENT
Start: 2022-02-06 | End: 2022-02-06 | Stop reason: HOSPADM

## 2022-02-06 RX ORDER — ASPIRIN 325 MG
325 TABLET ORAL ONCE
Status: COMPLETED | OUTPATIENT
Start: 2022-02-06 | End: 2022-02-06

## 2022-02-06 RX ORDER — PANTOPRAZOLE SODIUM 40 MG/1
40 TABLET, DELAYED RELEASE ORAL EVERY MORNING
Status: DISCONTINUED | OUTPATIENT
Start: 2022-02-06 | End: 2022-02-06 | Stop reason: HOSPADM

## 2022-02-06 RX ORDER — NITROGLYCERIN 0.4 MG/1
0.4 TABLET SUBLINGUAL
Status: DISCONTINUED | OUTPATIENT
Start: 2022-02-06 | End: 2022-02-06 | Stop reason: HOSPADM

## 2022-02-06 RX ORDER — SODIUM CHLORIDE 0.9 % (FLUSH) 0.9 %
10 SYRINGE (ML) INJECTION AS NEEDED
Status: DISCONTINUED | OUTPATIENT
Start: 2022-02-06 | End: 2022-02-06 | Stop reason: HOSPADM

## 2022-02-06 RX ORDER — FAMOTIDINE 10 MG/ML
20 INJECTION, SOLUTION INTRAVENOUS ONCE
Status: COMPLETED | OUTPATIENT
Start: 2022-02-06 | End: 2022-02-06

## 2022-02-06 RX ORDER — SODIUM CHLORIDE 0.9 % (FLUSH) 0.9 %
10 SYRINGE (ML) INJECTION EVERY 12 HOURS SCHEDULED
Status: DISCONTINUED | OUTPATIENT
Start: 2022-02-06 | End: 2022-02-06 | Stop reason: HOSPADM

## 2022-02-06 RX ORDER — ONDANSETRON 4 MG/1
4 TABLET, FILM COATED ORAL EVERY 6 HOURS PRN
Status: DISCONTINUED | OUTPATIENT
Start: 2022-02-06 | End: 2022-02-06 | Stop reason: HOSPADM

## 2022-02-06 RX ADMIN — SODIUM CHLORIDE, PRESERVATIVE FREE 10 ML: 5 INJECTION INTRAVENOUS at 01:05

## 2022-02-06 RX ADMIN — PANTOPRAZOLE SODIUM 40 MG: 40 TABLET, DELAYED RELEASE ORAL at 07:38

## 2022-02-06 RX ADMIN — MORPHINE SULFATE 2 MG: 2 INJECTION, SOLUTION INTRAMUSCULAR; INTRAVENOUS at 01:04

## 2022-02-06 RX ADMIN — OXYCODONE HYDROCHLORIDE AND ACETAMINOPHEN 250 MG: 500 TABLET ORAL at 08:32

## 2022-02-06 RX ADMIN — PERFLUTREN 2 ML: 6.52 INJECTION, SUSPENSION INTRAVENOUS at 10:28

## 2022-02-06 RX ADMIN — SERTRALINE HYDROCHLORIDE 25 MG: 50 TABLET, FILM COATED ORAL at 08:33

## 2022-02-06 RX ADMIN — FAMOTIDINE 20 MG: 10 INJECTION INTRAVENOUS at 01:03

## 2022-02-06 RX ADMIN — SODIUM CHLORIDE, PRESERVATIVE FREE 10 ML: 5 INJECTION INTRAVENOUS at 08:33

## 2022-02-06 RX ADMIN — IOPAMIDOL 85 ML: 755 INJECTION, SOLUTION INTRAVENOUS at 01:54

## 2022-02-06 RX ADMIN — Medication 50 MCG: at 08:32

## 2022-02-06 RX ADMIN — METOPROLOL TARTRATE 25 MG: 25 TABLET ORAL at 08:33

## 2022-02-06 RX ADMIN — ASPIRIN 325 MG: 325 TABLET ORAL at 01:02

## 2022-02-06 RX ADMIN — MORPHINE SULFATE 2 MG: 2 INJECTION, SOLUTION INTRAMUSCULAR; INTRAVENOUS at 02:43

## 2022-02-06 RX ADMIN — TECHNETIUM TC 99M SESTAMIBI 1 DOSE: 1 INJECTION INTRAVENOUS at 13:02

## 2022-02-06 RX ADMIN — TECHNETIUM TC 99M SESTAMIBI 1 DOSE: 1 INJECTION INTRAVENOUS at 11:55

## 2022-02-06 RX ADMIN — ATORVASTATIN CALCIUM 10 MG: 10 TABLET, FILM COATED ORAL at 08:34

## 2022-02-06 RX ADMIN — MULTIPLE VITAMINS W/ MINERALS TAB 1 TABLET: TAB at 08:33

## 2022-02-06 NOTE — ED NOTES
Pt in mask & this RN in appropriate PPE during care.   Hand hygiene was performed before and after care.       Geetha Cantu, MILLY  02/05/22 [id number]

## 2022-02-06 NOTE — PROGRESS NOTES
[facility]     Progress Note    Name: Jayme Zaragoza ADMIT: 2022   : 1940  PCP: Srinivasan Smyth Jr., MD    MRN: [id number] LOS: 0 days   AGE/SEX: 81 y.o. male  ROOM: 108/1     Subjective   Subjective     Subjective   Patient reports that he is feeling well at this time. No chest pain or shortness of breath overnight. He underwent stress test and echo today that were both normal and cardiology has signed off. Patient reports that he is ready to be discharged.    Review of Systems   All other systems reviewed and are negative.         Objective   Objective     Objective   Vital Signs  Temp:  [97.8 °F (36.6 °C)-98.4 °F (36.9 °C)] 98.1 °F (36.7 °C)  Heart Rate:  [70-96] 84  Resp:  [16-18] 18  BP: (105-142)/(62-90) 117/69  SpO2:  [93 %-98 %] 96 %  on   ;   Device (Oxygen Therapy): room air  Body mass index is 26.31 kg/m².  Physical Exam  Vitals and nursing note reviewed.   Constitutional:       General: He is not in acute distress.     Appearance: Normal appearance. He is normal weight.   HENT:      Head: Normocephalic and atraumatic.      Nose: Nose normal.      Mouth/Throat:      Mouth: Mucous membranes are moist.   Eyes:      Extraocular Movements: Extraocular movements intact.   Cardiovascular:      Rate and Rhythm: Normal rate and regular rhythm.      Pulses: Normal pulses.      Heart sounds: Normal heart sounds.   Pulmonary:      Effort: Pulmonary effort is normal. No respiratory distress.      Breath sounds: Normal breath sounds.   Abdominal:      General: Abdomen is flat. Bowel sounds are normal.      Palpations: Abdomen is soft.   Musculoskeletal:         General: Normal range of motion.      Cervical back: Normal range of motion and neck supple. No rigidity.   Skin:     General: Skin is warm and dry.   Neurological:      General: No focal deficit present.      Mental Status: He is alert and oriented to person, place, and time. Mental status is at baseline.   Psychiatric:         Mood and  Affect: Mood normal.         Behavior: Behavior normal.         Thought Content: Thought content normal.         Judgment: Judgment normal.         Results Review     I reviewed the patient's new clinical results.  Results from last 7 days   Lab Units 02/06/22  0511 02/05/22  2307   WBC 10*3/mm3 10.05 10.89*   HEMOGLOBIN g/dL 11.4* 12.3*   PLATELETS 10*3/mm3 120* 139*     Results from last 7 days   Lab Units 02/06/22  0511 02/05/22  2307   SODIUM mmol/L 138 139   POTASSIUM mmol/L 4.0 4.1   CHLORIDE mmol/L 107 105   CO2 mmol/L 22.6 22.8   BUN mg/dL 16 18   CREATININE mg/dL 0.96 1.12   GLUCOSE mg/dL 130* 126*   Estimated Creatinine Clearance: 72.6 mL/min (by C-G formula based on SCr of 0.96 mg/dL).  Results from last 7 days   Lab Units 02/05/22  2307   ALBUMIN g/dL 3.90   BILIRUBIN mg/dL 0.2   ALK PHOS U/L 57   AST (SGOT) U/L 19   ALT (SGPT) U/L 15     Results from last 7 days   Lab Units 02/06/22  0511 02/05/22  2307   CALCIUM mg/dL 8.0* 8.6   ALBUMIN g/dL  --  3.90       COVID19   Date Value Ref Range Status   02/06/2022 Not Detected Not Detected - Ref. Range Final   01/29/2022 Not Detected Not Detected - Ref. Range Final     No results found for: HGBA1C, POCGLU    Adult Transthoracic Echo Complete w/ Color, Spectral and Contrast if necessary per protocol  · Calculated left ventricular EF = 60.5% Estimated left ventricular EF was   in agreement with the calculated left ventricular EF.  · Left ventricular diastolic function was normal.  · There is no evidence of pericardial effusion. .     CT Angiogram Chest  Narrative: CT ANGIOGRAM OF THE CHEST     HISTORY: Lower sternal chest pain     COMPARISON: 06/03/2016     TECHNIQUE: Axial CT imaging was obtained through the thorax. IV contrast  was administered. Three-D reformatted images were obtained.     FINDINGS:  No acute pulmonary thromboembolus is seen. Thoracic aorta measures  within normal size limits. No definite dissection is seen. There are  coronary artery  calcifications. There is calcification of the thoracic  aorta. The patient has a small pericardial effusion. This is  particularly notable inferiorly. The heart is enlarged. The thyroid  gland and trachea appear unremarkable. There is a moderate hiatal  hernia. There is some biapical scarring. Some additional scarring and  atelectasis are noted at the lung bases. Mediastinal lymph nodes do not  appear pathologically enlarged. 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 No acute osseous abnormalities are seen.     Impression:    1. No acute pulmonary thromboembolus identified.  2. Small pericardial effusion.     Radiation dose reduction techniques were utilized, including automated  exposure control and exposure modulation based on body size.     This report was finalized on 2/6/2022 2:05 AM by Dr. Vonda Lala M.D.       Scheduled Medications  vitamin C, 250 mg, Oral, Daily  atorvastatin, 10 mg, Oral, Daily  metoprolol tartrate, 25 mg, Oral, BID  multivitamin with minerals, 1 tablet, Oral, Daily  pantoprazole, 40 mg, Oral, QAM  [START ON 2/7/2022] rivaroxaban, 10 mg, Oral, Daily  sertraline, 25 mg, Oral, Daily  sodium chloride, 10 mL, Intravenous, Q12H  vitamin B-12, 50 mcg, Oral, Daily    Infusions   Diet  NPO Diet         Assessment/Plan   Assessment / Plan       Active Hospital Problems    Diagnosis  POA   • Chest pain [R07.9]  Yes      Resolved Hospital Problems   No resolved problems to display.       81 y.o. male admitted with chest pain to observation unit.     1. 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 History of DVT  -on Xarelto, last dose 2100 on 2/5/2022     4. Hyperlipidemia  -continue statin therapy     5. Depression and  anxiety  -continue sertraline       · SCDs for DVT prophylaxis.  · Full code.  · Discussed with patient.  · Anticipate discharge today.    This progress note will additionally serve as discharge summary.     NANCY Reyes  [facility]  02/06/22  13:47 EST

## 2022-02-06 NOTE — H&P
[facility]   HISTORY AND PHYSICAL    Patient Name: Jayme Zaragoza  : 1940  MRN: [id number]  Primary Care Physician:  Srinivasan Smyth Jr., MD  Date of admission: 2022    Subjective   Subjective     Chief Complaint:   Chief Complaint   Patient presents with   • Pain With Breathing   • Chest Pain         HPI:    Jayme Zaragoza is a 81 y.o. 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  Patient states he had sudden onset lower sternal sharp in nature chest discomfort that started around 8:30 PM yesterday evening.  Patient states pain is worse with deep breaths.  Patient denies shortness of air, nausea, vomiting, diaphoresis associated with chest pain. States he also had a headache at that time, states he felt miserable so he elected to come to the emergency department. CT of chest in the ED negative for pulmonary embolism but does show a small pericardial effusion.  ED physician spoke with cardiology who requested observation admission and echo in the morning.    Review of Systems   All systems were reviewed and negative except for: what is discussed in the HPI    Personal History     Past Medical History:   Diagnosis Date   • Anemia    • Anxiety    • Arthritis    • Cancer (HCC)     skin cancer   • Deep vein thrombosis (DVT) (HCC)     Left arm   • Deep vein thrombosis (HCC)     Right leg separate instance from arm   • History of hepatitis 1960    Pt says type B, but possible type A. Resolved    • History of transfusion    • Hyperlipidemia    • PSVT (paroxysmal supraventricular tachycardia) (HCC)        Past Surgical History:   Procedure Laterality Date   • APPENDECTOMY     • CARDIAC ELECTROPHYSIOLOGY PROCEDURE N/A 2022    Procedure: Ablation SVT;  Surgeon: Markus Conway MD;  Location: [facility]  LOCATION;  Service: Cardiovascular;  Laterality: N/A;   • COLONOSCOPY  approx 2016    negative per pt    • COLONOSCOPY N/A 7/29/2020    Procedure: COLONOSCOPY to cecum and TI with hot snare polypectomy;  Surgeon: Zach Lerner MD;  Location: [facility] ENDOSCOPY;  Service: Gastroenterology;  Laterality: N/A;  pre: occult blood in stool  Post: polyp, hemorrhoids   • EYE SURGERY Bilateral     Cataracks   • SKIN BIOPSY     • SKIN BIOPSY     • TONSILLECTOMY         Family History: family history includes Alcohol abuse in his brother; Arthritis in his mother; Cancer in his mother; Depression in his brother; Diabetes in his brother and father; Heart failure in his father; Hypertension in his brother; Prostate cancer in his brother. Otherwise pertinent FHx was reviewed and not pertinent to current issue.    Social History:  reports that he quit smoking about 61 years ago. His smoking use included cigars and cigarettes. He has a 2.50 pack-year smoking history. He has never used smokeless tobacco. He reports current alcohol use. He reports that he does not use drugs.    Home Medications:  Vitamin D, esomeprazole, metoprolol tartrate, multivitamin with minerals, rivaroxaban, sertraline, simvastatin, vitamin B-12, and vitamin C    Allergies:  No Known Allergies    Objective   Objective     Vitals:   Temp:  [97.8 °F (36.6 °C)] 97.8 °F (36.6 °C)  Heart Rate:  [84-96] 84  Resp:  [18] 18  BP: (112-142)/(68-90) 112/68  Physical Exam     GENERAL: 81 y.o. YO male a/o x 4, NAD  SKIN: Warm pink and dry   HEENT:  PERRLA, EOM intact, conjunctiva normal, sclera clear  NECK: supple, no JVD  LUNGS: Clear to auscultation bilaterally without wheezes, rales or rhonchi.  No accessory muscle use and no nasal flaring.  CARDIAC:  Regular rate and rhythm, S1-S2.  No murmurs, rubs or gallops.  No peripheral edema.  Equal pulses bilaterally.  ABDOMEN: Soft, nontender, nondistended.  No guarding or rebound tenderness.  Normal bowel  sounds.  MUSCULOSKELETAL: Moves all extremities well.  No deformity.  NEURO: Cranial nerves II through XII grossly intact.  No gross focal deficits.  Alert.  Normal speech and motor.  PSYCH: Normal mood and affect      Result Review    Result Review:  I have personally reviewed the results from the time of this admission to 2/6/2022 03:35 EST and agree with these findings:  [x]  Laboratory  []  Microbiology  [x]  Radiology  [x]  EKG/Telemetry   []  Cardiology/Vascular   []  Pathology  [x]  Old records  []  Other:  Most notable findings include:     XR Chest 1 View    Result Date: 2/5/2022  No acute findings.  This report was finalized on 2/5/2022 11:09 PM by Dr. Vonda Lala M.D.      CT Angiogram Chest    Result Date: 2/6/2022   1. No acute pulmonary thromboembolus identified. 2. Small pericardial effusion.  Radiation dose reduction techniques were utilized, including automated exposure control and exposure modulation based on body size.  This report was finalized on 2/6/2022 2:05 AM by Dr. Vonda Lala M.D.      EKG         EKG time: 2/5/2022 22:17  Rhythm/Rate: Multiple PACs, 83 bpm   NJ: Normal P waves, NJ interval 171  QRS, axis: 102, left axis deviation  QTc 422  ST and T waves: No ST elevation or depression, no spiked T waves  EKG Tracing Interpreted Contemporaneously by me, independently viewed by me and MD.  When compared to prior on 2/1/2022, PVCs resolved    Assessment/Plan   Assessment / Plan     Brief Patient Summary:  Jayme Zaragoza is a 81 y.o. male who is being admitted to the observation unit for further evaluation of chest pain.    Active Hospital Problems:  Active Hospital Problems    Diagnosis    • Chest pain      Plan:     1. 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 History of DVT  -on Xarelto, last dose 2100 on 2/5/2022    4.  Hyperlipidemia  -continue statin therapy    5. Depression and anxiety  -continue sertraline       DVT prophylaxis:  No DVT prophylaxis order currently exists.    CODE STATUS:       Admission Status:  I believe this patient meets observation status.    I wore an N95 mask, face shield, and gloves during this patient encounter. Patient also wearing a surgical mask throughout encounter. Hand hygeine performed before and after seeing the patient.    Electronically signed by JOHN Zhou, 02/06/22, 3:35 AM EST.

## 2022-02-06 NOTE — CASE MANAGEMENT/SOCIAL WORK
Discharge Planning Assessment  [facility]     Patient Name: Jayme Zaragoza  MRN: [id number]  Today's Date: 2/6/2022    Admit Date: 2/5/2022     Discharge Needs Assessment     Row Name 02/06/22 1114       Living Environment    Lives With spouse    Current Living Arrangements home/apartment/condo    Primary Care Provided by self    Provides Primary Care For no one    Family Caregiver if Needed spouse    Quality of Family Relationships supportive    Able to Return to Prior Arrangements yes       Resource/Environmental Concerns    Resource/Environmental Concerns none    Transportation Concerns car, none       Transition Planning    Patient/Family Anticipates Transition to home with family    Patient/Family Anticipated Services at Transition none    Transportation Anticipated family or friend will provide       Discharge Needs Assessment    Equipment Currently Used at Home none    Concerns to be Addressed no discharge needs identified    Anticipated Changes Related to Illness none    Equipment Needed After Discharge none               Discharge Plan     Row Name 02/06/22 1112       Plan    Plan Introduced self and explained role of CCP. PPE used    Plan Comments Lives w/ wife Vaughn in house and plans to return at d/c. Has stairs at home and able to navigate them w/o difficulty. Spouse able to assist at e if needed and will provide transport at d/c. No assistive devices used. Independent w/ ADLs. Uses [facility]. Denies any d/c needs              Continued Care and Services - Admitted Since 2/5/2022    Coordination has not been started for this encounter.          Demographic Summary    No documentation.                Functional Status    No documentation.                Psychosocial    No documentation.                Abuse/Neglect    No documentation.                Legal    No documentation.                Substance Abuse    No documentation.                Patient Forms    No  documentation.                   Bárbara Vera RN

## 2022-02-06 NOTE — PLAN OF CARE
Goal Outcome Evaluation:  Patient discharged home. Agreeable to follow up care plan. Patient verbalizes understanding of discharge instructions,follow up appts,medications to continue or start/stop, s/s to call MD or return to ed. All belongings with patient upon discharge.

## 2022-02-06 NOTE — ED NOTES
.  Nursing report ED to floor  Jayme Zaragoza  81 y.o.  male    HPI :   Chief Complaint   Patient presents with   • Pain With Breathing   • Chest Pain       Admitting doctor:   Jayme Ramirez II, MD    Admitting diagnosis:   The encounter diagnosis was Chest pain, unspecified type.    Code status:   Current Code Status     Date Active Code Status Order ID Comments User Context       Prior    Advance Care Planning Activity          Allergies:   Patient has no known allergies.    Intake and Output  No intake or output data in the 24 hours ending 02/06/22 0312    Weight:   There were no vitals filed for this visit.    Most recent vitals:   Vitals:    02/05/22 2309 02/05/22 2311 02/05/22 2315 02/06/22 0001   BP: 133/90 132/78  142/83   Pulse: 95 89 85 86   Resp:       Temp:       SpO2: 96% 97% 96% 97%       Active LDAs/IV Access:   Lines, Drains & Airways     Active LDAs     Name Placement date Placement time Site Days    Peripheral IV 02/06/22 0103 Right Antecubital 02/06/22  0103  Antecubital  less than 1                Labs (abnormal labs have a star):   Labs Reviewed   COMPREHENSIVE METABOLIC PANEL - Abnormal; Notable for the following components:       Result Value    Glucose 126 (*)     All other components within normal limits    Narrative:     GFR Normal >60  Chronic Kidney Disease <60  Kidney Failure <15     PROTIME-INR - Abnormal; Notable for the following components:    Protime 15.9 (*)     INR 1.27 (*)     All other components within normal limits   CBC WITH AUTO DIFFERENTIAL - Abnormal; Notable for the following components:    WBC 10.89 (*)     RBC 3.65 (*)     Hemoglobin 12.3 (*)     Hematocrit 34.6 (*)     MCH 33.7 (*)     Platelets 139 (*)     Neutrophil % 80.0 (*)     Lymphocyte % 9.7 (*)     Neutrophils, Absolute 8.71 (*)     Monocytes, Absolute 0.99 (*)     All other components within normal limits   BNP (IN-HOUSE) - Normal    Narrative:     Among patients with dyspnea, NT-proBNP is highly  sensitive for the detection of acute congestive heart failure. In addition NT-proBNP of <300 pg/ml effectively rules out acute congestive heart failure with 99% negative predictive value.    Results may be falsely decreased if patient taking Biotin.     TROPONIN (IN-HOUSE) - Normal    Narrative:     Troponin T Reference Range:  <= 0.03 ng/mL-   Negative for AMI  >0.03 ng/mL-     Abnormal for myocardial necrosis.  Clinicians would have to utilize clinical acumen, EKG, Troponin and serial changes to determine if it is an Acute Myocardial Infarction or myocardial injury due to an underlying chronic condition.       Results may be falsely decreased if patient taking Biotin.     TROPONIN (IN-HOUSE) - Normal    Narrative:     Troponin T Reference Range:  <= 0.03 ng/mL-   Negative for AMI  >0.03 ng/mL-     Abnormal for myocardial necrosis.  Clinicians would have to utilize clinical acumen, EKG, Troponin and serial changes to determine if it is an Acute Myocardial Infarction or myocardial injury due to an underlying chronic condition.       Results may be falsely decreased if patient taking Biotin.     COVID PRE-OP / PRE-PROCEDURE SCREENING ORDER (NO ISOLATION)    Narrative:     The following orders were created for panel order COVID PRE-OP / PRE-PROCEDURE SCREENING ORDER (NO ISOLATION) - Swab, Nasopharynx.  Procedure                               Abnormality         Status                     ---------                               -----------         ------                     COVID-19,[facility] IN-HOUSE...[[id number]]                      In process                   Please view results for these tests on the individual orders.   COVID-19CHAPINCITO IN-HOUSE CEPHEID/SEN, NP SWAB IN TRANSPORT MEDIA 8-12 HR TAT   RAINBOW DRAW    Narrative:     The following orders were created for panel order [facility] Draw.  Procedure                               Abnormality         Status                     ---------                                -----------         ------                     Green Top (Gel)[[id number]]                                  Final result               Lavender Top[[id number]]                                     Final result               Gold Top - SST[[id number]]                                   Final result               Light Blue Top[[id number]]                                   Final result                 Please view results for these tests on the individual orders.   CBC AND DIFFERENTIAL    Narrative:     The following orders were created for panel order CBC & Differential.  Procedure                               Abnormality         Status                     ---------                               -----------         ------                     CBC Auto Differential[[id number]]        Abnormal            Final result                 Please view results for these tests on the individual orders.   GREEN TOP   LAVENDER TOP   GOLD TOP - SST   LIGHT BLUE TOP       EKG:   ECG 12 Lead   Preliminary Result   HEART RATE= 83  bpm   RR Interval= 876  ms   MO Interval= 171  ms   P Horizontal Axis= 36  deg   P Front Axis= 72  deg   QRSD Interval= 102  ms   QT Interval= 395  ms   QRS Axis= -17  deg   T Wave Axis= 45  deg   - ABNORMAL ECG -   Sinus rhythm   Atrial premature complexes   Borderline left axis deviation   Low voltage, precordial leads   Electronically Signed By:    Date and Time of Study: 2022-02-05 22:17:36          Meds given in ED:   Medications   sodium chloride 0.9 % flush 10 mL (10 mL Intravenous Given 2/6/22 0105)   aspirin tablet 325 mg (325 mg Oral Given 2/6/22 0102)   famotidine (PEPCID) injection 20 mg (20 mg Intravenous Given 2/6/22 0103)   morphine injection 2 mg (2 mg Intravenous Given 2/6/22 0104)   iopamidol (ISOVUE-370) 76 % injection 100 mL (85 mL Intravenous Given by Other 2/6/22 0154)   morphine injection 2 mg (2 mg Intravenous Given 2/6/22 0243)       Imaging results:  XR Chest 1 View    Result Date:  2022  No acute findings.  This report was finalized on 2022 11:09 PM by Dr. Vonda Lala M.D.      CT Angiogram Chest    Result Date: 2022   1. No acute pulmonary thromboembolus identified. 2. Small pericardial effusion.  Radiation dose reduction techniques were utilized, including automated exposure control and exposure modulation based on body size.  This report was finalized on 2022 2:05 AM by Dr. Vonda Lala M.D.        Ambulatory status:   -    Social issues:   Social History     Socioeconomic History   • Marital status:    Tobacco Use   • Smoking status: Former Smoker     Packs/day: 0.25     Years: 10.00     Pack years: 2.50     Types: Cigars, Cigarettes     Quit date: 1960     Years since quittin.7   • Smokeless tobacco: Never Used   Vaping Use   • Vaping Use: Never used   Substance and Sexual Activity   • Alcohol use: Yes     Comment: now 1-3 glasses of wine per day; caffeine use   • Drug use: No   • Sexual activity: Yes       NIH Stroke Scale:        Nursing report ED to floor:       Geetha Cantu RN  22 0312

## 2022-02-06 NOTE — CONSULTS
Patient Name: Jayme Zaragoza  Age/Sex: 81 y.o. male  : 1940  MRN: [id number]    Date of Admission: 2022  Date of Encounter Visit: 22  Encounter Provider: Katt Patrick RN  Place of Service: [facility] CARDIOLOGY      Referring Provider: Jayme Ramirez II,*  Patient Care Team:  Srinivasan Smyth Jr., MD as PCP - General  Srinivasan Smyth Jr., MD as PCP - Family Medicine    Subjective:     Consulted for: chest pain    Chief Complaint: chest pressure, pain with breathing       History of Present Illness:  Jayme Zaragoza is a 81 y.o. male with a history of DVT, hyperlipidemia, chronic anticoagulation with Xarelto and paroxysmal SVT. He underwent cardiac ablation on 22 with Dr. Conway and had normal AV function post-ablation. His EF is 65% and he has normal LV systolic and diastolic function. He is followed by Dr. Hardy and Dr. Conway.    He presented to the emergency department with complaints of lower sternal chest pressure. Patient described the chest pressure as sharp in nature and worse with deep breath. His initial EKG in the ER showed patient with ST and PACs, previously atrial fibrillation. His troponin has been negative x3. .0. Chest x-ray negative for acute findings. He had a CT chest performed showing no acute pulmonary embolism and small pericardial effusion. He was given a total of 4mg IV morphine and loading dose of aspirin in the ER. He was admitted to observation for further cardiac workup.      81-year-old admitted with chest pain  Previous testing:   ECHO 21  · Estimated left ventricular EF = 65% Left ventricular systolic function is normal.  · Left ventricular diastolic function was normal.    EP Study 22  Conclusions     1. Dual AV node physiology noted.  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 Normal AV node function post ablation    Past Medical History:  Past Medical History:   Diagnosis Date   • Anemia    • Anxiety    • Arthritis    • Cancer (HCC)     skin cancer   • Deep vein thrombosis (DVT) (HCC)     Left arm   • Deep vein thrombosis (HCC)     Right leg separate instance from arm   • History of hepatitis 1960    Pt says type B, but possible type A. Resolved    • History of transfusion    • Hyperlipidemia    • PSVT (paroxysmal supraventricular tachycardia) (HCC)        Past Surgical History:   Procedure Laterality Date   • APPENDECTOMY     • CARDIAC ELECTROPHYSIOLOGY PROCEDURE N/A 2/1/2022    Procedure: Ablation SVT;  Surgeon: Markus Conway MD;  Location: [facility] CATH INVASIVE LOCATION;  Service: Cardiovascular;  Laterality: N/A;   • COLONOSCOPY  approx 2016    negative per pt    • COLONOSCOPY N/A 7/29/2020    Procedure: COLONOSCOPY to cecum and TI with hot snare polypectomy;  Surgeon: Zach Lerner MD;  Location: [facility] ENDOSCOPY;  Service: Gastroenterology;  Laterality: N/A;  pre: occult blood in stool  Post: polyp, hemorrhoids   • EYE SURGERY Bilateral     Cataracks   • SKIN BIOPSY     • SKIN BIOPSY     • TONSILLECTOMY         Home Medications:   Medications Prior to Admission   Medication Sig Dispense Refill Last Dose   • esomeprazole (NexIUM) 20 MG capsule Take 20 mg by mouth every morning before breakfast.      • metoprolol tartrate (LOPRESSOR) 25 MG tablet Take 25 mg by mouth 2 (Two) Times a Day.      • Multiple Vitamins-Minerals (CENTRUM SILVER ADULT 50+ PO) Take 1 tablet by mouth daily.      • sertraline (ZOLOFT) 25 MG tablet Take 25 mg by mouth daily.      • simvastatin (ZOCOR) 10 MG tablet Take 10 mg by mouth every night.      • vitamin B-12 (CYANOCOBALAMIN) 100 MCG tablet Take 50 mcg by mouth Daily.      • vitamin C (ASCORBIC ACID) 250 MG tablet Take 250 mg by mouth Daily.      • VITAMIN D PO Take 25 mcg by mouth Daily.      • XARELTO 10 MG tablet Take 10 mg  "by mouth Daily.          Allergies:  No Known Allergies    Past Social History:  Social History     Socioeconomic History   • Marital status:    Tobacco Use   • Smoking status: Former Smoker     Packs/day: 0.25     Years: 10.00     Pack years: 2.50     Types: Cigars, Cigarettes     Quit date: 1960     Years since quittin.7   • Smokeless tobacco: Never Used   Vaping Use   • Vaping Use: Never used   Substance and Sexual Activity   • Alcohol use: Yes     Comment: now 1-3 glasses of wine per day; caffeine use   • Drug use: No   • Sexual activity: Yes        Past Family History:  Family History   Problem Relation Age of Onset   • Arthritis Mother    • Cancer Mother    • Alcohol abuse Brother    • Depression Brother    • Diabetes Brother    • Hypertension Brother    • Prostate cancer Brother    • Heart failure Father    • Diabetes Father          Review of Systems  Constitutional: No wt loss, fever   Gastrointestinal: No nausea , abdominal pain  Behavioral/Psych: No insomnia or anxiety   Cardiovascular ----positive for chest pain. All other systems reviewed and are negative        Objective:     Objective:  Temp:  [97.8 °F (36.6 °C)-98.4 °F (36.9 °C)] 98.4 °F (36.9 °C)  Heart Rate:  [78-96] 78  Resp:  [16-18] 16  BP: (112-142)/(68-90) 134/84  No intake or output data in the 24 hours ending 22 0712  Body mass index is 26.31 kg/m².      22  0348   Weight: 88 kg (194 lb)                    Physical Exam  /69 (BP Location: Right arm, Patient Position: Lying)   Pulse 84   Temp 98.1 °F (36.7 °C) (Oral)   Resp 18   Ht 182.9 cm (72\")   Wt 88 kg (194 lb)   SpO2 96%   BMI 26.31 kg/m²     General appearance: No acute changes   Eyes: Sclerae conjunctivae normal, pupils reactive   HENT: Atraumatic; oropharynx clear with moist mucous membranes and no mucosal ulcerations;  Neck: Trachea midline; NECK, supple, no thyromegaly or lymphadenopathy   Lungs: Normal size and shape, normal breath sounds, " equal distribution of air, no rales and rhonchi   CV: S1-S2 regular, no murmurs, no rub, no gallop   Abdomen: Soft, nontender; no masses , no abnormal abdominal sounds   Extremities: No deformity , normal color , no peripheral edema   Skin: Normal temperature, turgor and texture; no rash, ulcers  Psych: Appropriate affect, alert and oriented to person, place and time           Lab Review:     Results from last 7 days   Lab Units 02/06/22  0511 02/05/22 2307 02/05/22 2307   SODIUM mmol/L 138  --  139   POTASSIUM mmol/L 4.0  --  4.1   CHLORIDE mmol/L 107  --  105   CO2 mmol/L 22.6  --  22.8   BUN mg/dL 16  --  18   CREATININE mg/dL 0.96  --  1.12   GLUCOSE mg/dL 130*   < > 126*   CALCIUM mg/dL 8.0*  --  8.6    < > = values in this interval not displayed.       Results from last 7 days   Lab Units 02/06/22  0511 02/06/22  0134 02/05/22 2307   TROPONIN T ng/mL <0.010 <0.010 <0.010     Results from last 7 days   Lab Units 02/06/22  0511   WBC 10*3/mm3 10.05   HEMOGLOBIN g/dL 11.4*   HEMATOCRIT % 32.1*   PLATELETS 10*3/mm3 120*     Results from last 7 days   Lab Units 02/05/22 2307   INR  1.27*                 Results from last 7 days   Lab Units 02/05/22 2307   PROBNP pg/mL 300.0               Imaging:    Imaging Results (Most Recent)     Procedure Component Value Units Date/Time    CT Angiogram Chest [[id number]] Collected: 02/06/22 0159     Updated: 02/06/22 0208    Narrative:      CT ANGIOGRAM OF THE CHEST     HISTORY: Lower sternal chest pain     COMPARISON: 06/03/2016     TECHNIQUE: Axial CT imaging was obtained through the thorax. IV contrast  was administered. Three-D reformatted images were obtained.     FINDINGS:  No acute pulmonary thromboembolus is seen. Thoracic aorta measures  within normal size limits. No definite dissection is seen. There are  coronary artery calcifications. There is calcification of the thoracic  aorta. The patient has a small pericardial effusion. This is  particularly notable  inferiorly. The heart is enlarged. The thyroid  gland and trachea appear unremarkable. There is a moderate hiatal  hernia. There is some biapical scarring. Some additional scarring and  atelectasis are noted at the lung bases. Mediastinal lymph nodes do not  appear pathologically enlarged. 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 No acute osseous abnormalities are seen.       Impression:         1. No acute pulmonary thromboembolus identified.  2. Small pericardial effusion.     Radiation dose reduction techniques were utilized, including automated  exposure control and exposure modulation based on body size.     This report was finalized on 2/6/2022 2:05 AM by Dr. Vonda Lala M.D.       XR Chest 1 View [[id number]] Collected: 02/05/22 2307     Updated: 02/05/22 2312    Narrative:      SINGLE VIEW OF THE CHEST     HISTORY: Chest pain and tightness     COMPARISON: 11/13/2018     FINDINGS:  Cardiomegaly is present. No definite vascular congestion is seen.  Patient does appear to have hiatal hernia. There is chronic scarring  involving the minor fissure. No pneumothorax is identified. Some  additional scarring is suspected at the left lung base. No definite  acute infiltrates are seen.       Impression:      No acute findings.     This report was finalized on 2/5/2022 11:09 PM by Dr. Vonda Lala M.D.             Results for orders placed during the hospital encounter of 06/18/21    Adult Transthoracic Echo Complete w/ Color, Spectral and Contrast if Necessary Per Protocol    Interpretation Summary  · Estimated left ventricular EF = 65% Left ventricular systolic function is normal.  · Left ventricular diastolic function was normal.      EKG:         BASELINE:       I personally viewed and interpreted the patient's EKG/Telemetry data.    Assessment:   Assessment/Plan         Chest pain  Recent SVT ablation      Plan:   81-year-old  male recently underwent, SVT ablation was admitted because of retrosternal chest pain atypical at rest lasting for few seconds underwent echocardiogram as well as stress test which are normal    Patient can be discharged to be followed up as an outpatient       Thank you for allowing me to participate in the care of Jayme Zaragoza. Feel free to contact me directly with any further questions or concerns.    Katt Patrick RN  [facility]  02/06/22  07:12 EST

## 2022-02-06 NOTE — ED PROVIDER NOTES
EMERGENCY DEPARTMENT ENCOUNTER    CHIEF COMPLAINT  Chief Complaint: Chest pain  History given by: Patient  History limited by: Nothing  Room Number: 23/23  PMD: Srinivasan Smyth Jr., MD  Cardiologist: Dr. Hardy, Dr. Conway    HPI:  Pt is a 81 y.o. male presents complaining of lower sternal chest discomfort sharp in nature worse with deep breath onset at 8:30 PM.  Patient denies cough, fever, shortness of air, abdominal pain, nausea/vomiting, changes in urinary or bowel habits, changes to swelling of his lower extremities.  Patient reports she is compliant with his medications, last dose of Xarelto at 9 PM tonight.  Patient reports his discomfort is much improved now compared with earlier.    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   • 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   • Shortness of breath 06/03/2021   • Hyperlipidemia LDL goal <100 06/03/2021   • Dizziness 06/03/2021   • Paroxysmal SVT (supraventricular tachycardia) (HCC) 01/19/2022     Resolved Ambulatory Problems     Diagnosis Date Noted   • No Resolved Ambulatory Problems     Past Medical History:   Diagnosis Date   • Anemia    • Anxiety    • Arthritis    • Cancer (HCC)    • Deep vein thrombosis (DVT) (HCC)    • Deep vein thrombosis (HCC)    • History of hepatitis 1960   • History of transfusion    • Hyperlipidemia    • PSVT (paroxysmal supraventricular tachycardia) (HCC)        PAST SURGICAL  HISTORY  Past Surgical History:   Procedure Laterality Date   • APPENDECTOMY     • CARDIAC ELECTROPHYSIOLOGY PROCEDURE N/A 2022    Procedure: Ablation SVT;  Surgeon: Markus Conway MD;  Location: [facility] CATH INVASIVE LOCATION;  Service: Cardiovascular;  Laterality: N/A;   • COLONOSCOPY  approx 2016    negative per pt    • COLONOSCOPY N/A 2020    Procedure: COLONOSCOPY to cecum and TI with hot snare polypectomy;  Surgeon: Zach Lerner MD;  Location: [facility] ENDOSCOPY;  Service: Gastroenterology;  Laterality: N/A;  pre: occult blood in stool  Post: polyp, hemorrhoids   • EYE SURGERY Bilateral     Cataracks   • SKIN BIOPSY     • SKIN BIOPSY     • TONSILLECTOMY         FAMILY HISTORY  Family History   Problem Relation Age of Onset   • Arthritis Mother    • Cancer Mother    • Alcohol abuse Brother    • Depression Brother    • Diabetes Brother    • Hypertension Brother    • Prostate cancer Brother    • Heart failure Father    • Diabetes Father        SOCIAL HISTORY  Social History     Socioeconomic History   • Marital status:    Tobacco Use   • Smoking status: Former Smoker     Packs/day: 0.25     Years: 10.00     Pack years: 2.50     Types: Cigars, Cigarettes     Quit date: 1960     Years since quittin.7   • Smokeless tobacco: Never Used   Vaping Use   • Vaping Use: Never used   Substance and Sexual Activity   • Alcohol use: Yes     Comment: now 1-3 glasses of wine per day; caffeine use   • Drug use: No   • Sexual activity: Yes       ALLERGIES  Patient has no known allergies.    REVIEW OF SYSTEMS  Review of Systems   Constitutional: Negative for chills and fever.   HENT: Negative for sore throat and trouble swallowing.    Eyes: Negative for visual disturbance.   Respiratory: Negative for cough and shortness of breath.    Cardiovascular: Positive for chest pain. Negative for leg swelling.   Gastrointestinal: Negative for abdominal pain, diarrhea and vomiting.   Endocrine: Negative.     Genitourinary: Negative for decreased urine volume and frequency.   Musculoskeletal: Negative for neck pain.   Skin: Negative for rash.   Allergic/Immunologic: Negative.    Neurological: Negative for weakness and numbness.   Hematological: Negative.    Psychiatric/Behavioral: Negative.    All other systems reviewed and are negative.      PHYSICAL EXAM  ED Triage Vitals   Temp Heart Rate Resp BP SpO2   02/05/22 2212 02/05/22 2221 02/05/22 2212 -- 02/05/22 2221   97.8 °F (36.6 °C) 90 18  97 %      Temp src Heart Rate Source Patient Position BP Location FiO2 (%)   -- -- -- -- --              Physical Exam  Vitals and nursing note reviewed.   Constitutional:       General: He is in acute distress.   HENT:      Head: Normocephalic and atraumatic.   Cardiovascular:      Rate and Rhythm: Normal rate. Rhythm irregular.      Pulses:           Posterior tibial pulses are 2+ on the right side and 2+ on the left side.      Heart sounds: Normal heart sounds. No murmur heard.      Pulmonary:      Effort: Pulmonary effort is normal. No respiratory distress.      Breath sounds: Normal breath sounds. No wheezing.   Abdominal:      General: Bowel sounds are normal.      Palpations: Abdomen is soft.      Tenderness: There is no abdominal tenderness. There is no guarding or rebound.   Musculoskeletal:         General: Normal range of motion.      Cervical back: Normal range of motion.      Right lower leg: Edema ( 1+) present.      Left lower leg: Edema ( Trace) present.   Skin:     General: Skin is warm and dry.   Neurological:      Mental Status: He is alert and oriented to person, place, and time.   Psychiatric:         Mood and Affect: Affect normal.         LAB RESULTS  Lab Results (last 24 hours)     Procedure Component Value Units Date/Time    CBC & Differential [[id number]]  (Abnormal) Collected: 02/05/22 2307    Specimen: Blood Updated: 02/05/22 2343    Narrative:      The following orders were created for panel order CBC &  Differential.  Procedure                               Abnormality         Status                     ---------                               -----------         ------                     CBC Auto Differential[[id number]]        Abnormal            Final result                 Please view results for these tests on the individual orders.    Comprehensive Metabolic Panel [[id number]]  (Abnormal) Collected: 02/05/22 2307    Specimen: Blood Updated: 02/05/22 2355     Glucose 126 mg/dL      BUN 18 mg/dL      Creatinine 1.12 mg/dL      Sodium 139 mmol/L      Potassium 4.1 mmol/L      Comment: Slight hemolysis detected by analyzer. Results may be affected.        Chloride 105 mmol/L      CO2 22.8 mmol/L      Calcium 8.6 mg/dL      Total Protein 6.4 g/dL      Albumin 3.90 g/dL      ALT (SGPT) 15 U/L      AST (SGOT) 19 U/L      Alkaline Phosphatase 57 U/L      Total Bilirubin 0.2 mg/dL      eGFR Non African Amer 63 mL/min/1.73      Globulin 2.5 gm/dL      A/G Ratio 1.6 g/dL      BUN/Creatinine Ratio 16.1     Anion Gap 11.2 mmol/L     Narrative:      GFR Normal >60  Chronic Kidney Disease <60  Kidney Failure <15      BNP [[id number]]  (Normal) Collected: 02/05/22 2307    Specimen: Blood Updated: 02/06/22 0012     proBNP 300.0 pg/mL     Narrative:      Among patients with dyspnea, NT-proBNP is highly sensitive for the detection of acute congestive heart failure. In addition NT-proBNP of <300 pg/ml effectively rules out acute congestive heart failure with 99% negative predictive value.    Results may be falsely decreased if patient taking Biotin.      Troponin [[id number]]  (Normal) Collected: 02/05/22 2307    Specimen: Blood Updated: 02/05/22 2355     Troponin T <0.010 ng/mL     Narrative:      Troponin T Reference Range:  <= 0.03 ng/mL-   Negative for AMI  >0.03 ng/mL-     Abnormal for myocardial necrosis.  Clinicians would have to utilize clinical acumen, EKG, Troponin and serial changes to determine if it is an Acute  Myocardial Infarction or myocardial injury due to an underlying chronic condition.       Results may be falsely decreased if patient taking Biotin.      Protime-INR [[id number]]  (Abnormal) Collected: 02/05/22 2307    Specimen: Blood Updated: 02/05/22 2344     Protime 15.9 Seconds      INR 1.27    CBC Auto Differential [[id number]]  (Abnormal) Collected: 02/05/22 2307    Specimen: Blood Updated: 02/05/22 2343     WBC 10.89 10*3/mm3      RBC 3.65 10*6/mm3      Hemoglobin 12.3 g/dL      Hematocrit 34.6 %      MCV 94.8 fL      MCH 33.7 pg      MCHC 35.5 g/dL      RDW 13.0 %      RDW-SD 44.5 fl      MPV 10.7 fL      Platelets 139 10*3/mm3      Neutrophil % 80.0 %      Lymphocyte % 9.7 %      Monocyte % 9.1 %      Eosinophil % 0.6 %      Basophil % 0.3 %      Immature Grans % 0.3 %      Neutrophils, Absolute 8.71 10*3/mm3      Lymphocytes, Absolute 1.06 10*3/mm3      Monocytes, Absolute 0.99 10*3/mm3      Eosinophils, Absolute 0.07 10*3/mm3      Basophils, Absolute 0.03 10*3/mm3      Immature Grans, Absolute 0.03 10*3/mm3      nRBC 0.0 /100 WBC     COVID PRE-OP / PRE-PROCEDURE SCREENING ORDER (NO ISOLATION) - Swab, Nasopharynx [[id number]] Collected: 02/06/22 0106    Specimen: Swab from Nasopharynx Updated: 02/06/22 0129    Narrative:      The following orders were created for panel order COVID PRE-OP / PRE-PROCEDURE SCREENING ORDER (NO ISOLATION) - Swab, Nasopharynx.  Procedure                               Abnormality         Status                     ---------                               -----------         ------                     COVID-19,[facility] IN-HOUSE...[[id number]]                      In process                   Please view results for these tests on the individual orders.    COVID-19,[facility] IN-HOUSE CEPHEID/SEN NP SWAB IN TRANSPORT MEDIA 8-12 HR TAT - Swab, Nasopharynx [[id number]] Collected: 02/06/22 0106    Specimen: Swab from Nasopharynx Updated: 02/06/22 0129    Troponin [[id number]]  (Normal) Collected:  02/06/22 0134    Specimen: Blood Updated: 02/06/22 0201     Troponin T <0.010 ng/mL     Narrative:      Troponin T Reference Range:  <= 0.03 ng/mL-   Negative for AMI  >0.03 ng/mL-     Abnormal for myocardial necrosis.  Clinicians would have to utilize clinical acumen, EKG, Troponin and serial changes to determine if it is an Acute Myocardial Infarction or myocardial injury due to an underlying chronic condition.       Results may be falsely decreased if patient taking Biotin.            I ordered the above labs and reviewed the results    RADIOLOGY  CT Angiogram Chest   Final Result       1. No acute pulmonary thromboembolus identified.   2. Small pericardial effusion.       Radiation dose reduction techniques were utilized, including automated   exposure control and exposure modulation based on body size.       This report was finalized on 2/6/2022 2:05 AM by Dr. Vonda Lala M.D.          XR Chest 1 View   Final Result   No acute findings.       This report was finalized on 2/5/2022 11:09 PM by Dr. Vonda Lala M.D.               I ordered the above noted radiological studies. Interpreted by radiologist. Viewed by me in PACS.       PROCEDURES  Procedures      PROGRESS AND CONSULTS  ED Course as of 02/06/22 0245   Sun Feb 06, 2022   0033 Discussed with Dr. Trisha Gonzalez, on-call for Dr. Malagon, she request serial troponins, CT angio and admit to observation unit for echo, further testing, treatment as needed. [TO]   0219 Discussed with JOHN Bishop on for the observation unit who is aware of patient's presentation, my discussion with cardiology, patient's history and agrees to admit to the observation unit under the care of Dr. Ramirez for further testing, treatment as needed. [TO]      ED Course User Index  [TO] Mel Barth MD     EKG          EKG time: 2217  Rhythm/Rate: Sinus rhythm, rate in the 80s, frequent PACs  P waves and UT: Normal P waves, normal MICHAEL's  QRS, axis: Poor R wave  progression  ST and T waves: Nonspecific ST/T wave findings    Interpreted Contemporaneously by me, independently viewed  Minimally changed compared to prior 2/1/2022, PVCs resolved    Heart score 4    MEDICAL DECISION MAKING  Results were reviewed/discussed with the patient and they were also made aware of online access. Pt also made aware that some labs, such as cultures, will not be resulted during ER visit and followup with PMD is necessary.       MDM       DIAGNOSIS  Final diagnoses:   Chest pain, unspecified type       DISPOSITION  Admit to observation unit    Latest Documented Vital Signs:  As of 02:45 EST  BP- 142/83 HR- 86 Temp- 97.8 °F (36.6 °C) O2 sat- 97%    --  Patient was wearing facemask when I entered the room and throughout our encounter. Full protective equipment was worn throughout this patient encounter including a face mask, eye protection and gloves. Hand hygiene was performed before donning protective equipment and after removal when leaving the room.      Mel Barth MD  02/06/22 4830

## 2022-02-06 NOTE — CASE MANAGEMENT/SOCIAL WORK
Discharge Planning Assessment  [facility]     Patient Name: Jayme Zaragoza  MRN: [id number]  Today's Date: 2/6/2022    Admit Date: 2/5/2022     Discharge Needs Assessment    No documentation.                Discharge Plan     Row Name 02/06/22 1510       Plan    Final Discharge Disposition Code 01 - home or self-care              Continued Care and Services - Admitted Since 2/5/2022    Coordination has not been started for this encounter.       Expected Discharge Date and Time     Expected Discharge Date Expected Discharge Time    Feb 6, 2022          Demographic Summary    No documentation.                Functional Status    No documentation.                Psychosocial    No documentation.                Abuse/Neglect    No documentation.                Legal    No documentation.                Substance Abuse    No documentation.                Patient Forms    No documentation.                   Bárbara Vera RN

## 2022-02-06 NOTE — ED NOTES
Patient had a cardiac ablation on Tuesday, tonight he began to have midsternal chest pressure and pain with breathing. Pain is worse on inspiration. History of PE.      Katt Meyers RN  02/05/22 9080

## 2022-03-01 ENCOUNTER — OFFICE VISIT (OUTPATIENT)
Dept: CARDIOLOGY | Facility: CLINIC | Age: 82
End: 2022-03-01

## 2022-03-01 VITALS
SYSTOLIC BLOOD PRESSURE: 116 MMHG | BODY MASS INDEX: 26.31 KG/M2 | DIASTOLIC BLOOD PRESSURE: 78 MMHG | HEART RATE: 60 BPM | HEIGHT: 72 IN

## 2022-03-01 DIAGNOSIS — I47.1 PAROXYSMAL SVT (SUPRAVENTRICULAR TACHYCARDIA): Primary | ICD-10-CM

## 2022-03-01 DIAGNOSIS — Z98.890 H/O CARDIAC RADIOFREQUENCY ABLATION: ICD-10-CM

## 2022-03-01 PROCEDURE — 93000 ELECTROCARDIOGRAM COMPLETE: CPT | Performed by: NURSE PRACTITIONER

## 2022-03-01 PROCEDURE — 99213 OFFICE O/P EST LOW 20 MIN: CPT | Performed by: NURSE PRACTITIONER

## 2022-03-01 NOTE — PROGRESS NOTES
Date of Office Visit: 2022  Encounter Provider: NANCY Mccartney  Place of Service: [facility] CARDIOLOGY  Patient Name: Jayme Zaragoza  :1940    Chief Complaint   Patient presents with   • paroxysmal SVT     s/p ablation 22   :     HPI: Jayme Zaragoza is a 81 y.o. male who follows with Dr. Hardy, referred to EP for symptomatic PSVT. He also DVT x 2 and is on chronic AC.     Zio in Dec showed SVT, saw Dr. Conway and I in the office in he had just been started on metoprolol but when we reviewed his monitor and spoke with him it was recommended that he undergo ablation and he was agreeable.    He underwent successful ablation on  of a AVNRT.    He presents today for follow-up.    He has not had any recurrence of arrhythmia ever he did go to the emergency room on  with atypical chest pain, he ended up having an echo and a stress test which were both okay.  He has seen Dr. Smyth in follow-up and they discussed that perhaps he had a virus.  He also had a CT of the chest and had no PE--given his history of DVTs.  He remains on chronic anticoagulation.    He reports that since being discharged home from that visit he has done well.  He has not had any recurrent chest pain, dyspnea, PND, orthopnea, dizziness or edema.    Past Medical History:   Diagnosis Date   • Anemia    • Anxiety    • Arthritis    • Cancer (HCC)     skin cancer   • Chest pain    • Deep vein thrombosis (DVT) (HCC)     Left arm   • Deep vein thrombosis (HCC)     Right leg separate instance from arm   • History of hepatitis 1960    Pt says type B, but possible type A. Resolved    • History of transfusion    • Hyperlipidemia    • PSVT (paroxysmal supraventricular tachycardia) (HCC)        Past Surgical History:   Procedure Laterality Date   • APPENDECTOMY     • CARDIAC ELECTROPHYSIOLOGY PROCEDURE N/A 2022    Procedure: Ablation SVT;  Surgeon: Markus Conway MD;  Location:   INVASIVE LOCATION;  Service: Cardiovascular;  Laterality: N/A;   • COLONOSCOPY  approx 2016    negative per pt    • COLONOSCOPY N/A 2020    Procedure: COLONOSCOPY to cecum and TI with hot snare polypectomy;  Surgeon: Zach Lerner MD;  Location: [facility] ENDOSCOPY;  Service: Gastroenterology;  Laterality: N/A;  pre: occult blood in stool  Post: polyp, hemorrhoids   • EYE SURGERY Bilateral     Cataracks   • SKIN BIOPSY     • SKIN BIOPSY     • TONSILLECTOMY         Social History     Socioeconomic History   • Marital status:    Tobacco Use   • Smoking status: Former Smoker     Packs/day: 0.25     Years: 10.00     Pack years: 2.50     Types: Cigars, Cigarettes     Quit date: 1960     Years since quittin.8   • Smokeless tobacco: Never Used   Vaping Use   • Vaping Use: Never used   Substance and Sexual Activity   • Alcohol use: Yes     Comment: now 1-3 glasses of wine per day; caffeine use   • Drug use: No   • Sexual activity: Yes       Family History   Problem Relation Age of Onset   • Arthritis Mother    • Cancer Mother    • Alcohol abuse Brother    • Depression Brother    • Diabetes Brother    • Hypertension Brother    • Prostate cancer Brother    • Heart failure Father    • Diabetes Father        Review of Systems   Constitutional: Negative for chills, fever and malaise/fatigue.   Cardiovascular: Negative for chest pain, dyspnea on exertion, leg swelling, near-syncope, orthopnea, palpitations, paroxysmal nocturnal dyspnea and syncope.   Respiratory: Negative for cough and shortness of breath.    Musculoskeletal: Negative for joint pain, joint swelling and myalgias.   Gastrointestinal: Negative for abdominal pain, diarrhea, melena, nausea and vomiting.   Genitourinary: Negative for frequency and hematuria.   Neurological: Negative for light-headedness, numbness, paresthesias and seizures.   Allergic/Immunologic: Negative.    All other systems reviewed and are negative.      No Known  "Allergies      Current Outpatient Medications:   •  esomeprazole (NexIUM) 20 MG capsule, Take 20 mg by mouth every morning before breakfast., Disp: , Rfl:   •  metoprolol tartrate (LOPRESSOR) 25 MG tablet, Take 25 mg by mouth 2 (Two) Times a Day., Disp: , Rfl:   •  Multiple Vitamins-Minerals (CENTRUM SILVER ADULT 50+ PO), Take 1 tablet by mouth daily., Disp: , Rfl:   •  sertraline (ZOLOFT) 25 MG tablet, Take 25 mg by mouth daily., Disp: , Rfl:   •  simvastatin (ZOCOR) 10 MG tablet, Take 10 mg by mouth every night., Disp: , Rfl:   •  vitamin B-12 (CYANOCOBALAMIN) 100 MCG tablet, Take 50 mcg by mouth Daily., Disp: , Rfl:   •  vitamin C (ASCORBIC ACID) 250 MG tablet, Take 250 mg by mouth Daily., Disp: , Rfl:   •  VITAMIN D PO, Take 25 mcg by mouth Daily., Disp: , Rfl:   •  XARELTO 10 MG tablet, Take 10 mg by mouth Daily., Disp: , Rfl:       Objective:     Vitals:    03/01/22 1056   BP: 116/78   Pulse: 60   Height: 182.9 cm (72\")     Body mass index is 26.31 kg/m².    PHYSICAL EXAM:    Vitals Reviewed.   General Appearance: No acute distress, well developed and well nourished.   Eyes: Conjunctiva and lids: No erythema, swelling, or discharge. Sclera non-icteric.   HENT: Atraumatic, normocephalic. External eyes, ears, and nose normal.   Respiratory: No signs of respiratory distress. Respiration rhythm and depth normal.   Clear to auscultation. No rales, crackles, rhonchi, or wheezing auscultated.   Cardiovascular:  Heart Rate and Rhythm: Normal, Heart Sounds: Normal S1 and S2. No S3 or S4 noted.  Murmurs: No murmurs noted. No rubs, thrills, or gallops.   Arterial Pulses:  Posterior tibialis and dorsalis pedis pulses normal.   Lower Extremities: No edema noted.  Gastrointestinal:  Abdomen soft, non-distended, non-tender.   Musculoskeletal: Normal movement of extremities  Skin: Warm and dry.   Psychiatric: Patient alert and oriented to person, place, and time. Speech and behavior appropriate. Normal mood and affect. "       ECG 12 Lead    Date/Time: 3/1/2022 1:40 PM  Performed by: Fauzia Marina APRN  Authorized by: Fauzia Marina APRN   Comparison: compared with previous ECG   Similar to previous ECG  Rhythm: sinus rhythm  BPM: 60                Assessment:       Diagnosis Plan   1. Paroxysmal SVT (supraventricular tachycardia) (HCC)     2. H/O cardiac radiofrequency ablation--SVT            Plan:       1.-2.  SVT, status post successful ablation.  No recurrence.    3.  Atypical chest pain, ER/spittle work-up negative he had an echo, stress test and CT of the chest which ruled out PE.  He has not had any recurrence.    He ask about stopping the metoprolol I think this is fine he is currently taking 25 mg twice daily, I have asked him to decrease it to 25 mg once a day for a week and then he can stop and use it as needed if he was to have recurrent SVT and instructed him to call us.    He is actually getting ready to travel out of town as he usually goes to [place] for the summer months and so I have asked him to follow-up with Dr. Hardy when he returns to [place] which will probably be late October early November but instructed him to call if he has any problems.    As always, it has been a pleasure to participate in your patient's care.      Sincerely,         NANCY Lang

## 2022-11-10 ENCOUNTER — OFFICE VISIT (OUTPATIENT)
Dept: CARDIOLOGY | Facility: CLINIC | Age: 82
End: 2022-11-10

## 2022-11-10 VITALS
HEART RATE: 66 BPM | SYSTOLIC BLOOD PRESSURE: 130 MMHG | WEIGHT: 198 LBS | DIASTOLIC BLOOD PRESSURE: 76 MMHG | BODY MASS INDEX: 26.82 KG/M2 | HEIGHT: 72 IN

## 2022-11-10 DIAGNOSIS — I47.1 PAROXYSMAL SVT (SUPRAVENTRICULAR TACHYCARDIA): Primary | ICD-10-CM

## 2022-11-10 DIAGNOSIS — Z98.890 H/O CARDIAC RADIOFREQUENCY ABLATION: ICD-10-CM

## 2022-11-10 DIAGNOSIS — R06.02 SHORTNESS OF BREATH: ICD-10-CM

## 2022-11-10 PROCEDURE — 99213 OFFICE O/P EST LOW 20 MIN: CPT | Performed by: INTERNAL MEDICINE

## 2022-11-10 PROCEDURE — 93000 ELECTROCARDIOGRAM COMPLETE: CPT | Performed by: INTERNAL MEDICINE

## 2022-11-10 NOTE — PROGRESS NOTES
Date of Office Visit: 11/10/22    Encounter Provider: Keyshawn Hardy MD  Place of Service: [facility] CARDIOLOGY  Patient Name: Jayme Zaragoza  :1940    Chief Complaint   Patient presents with   • Paroxysmal SVT    • Follow-up     1 year       HPI:  82-year-old with a medical history of symptomatic paroxysmal SVT, DVT, chronic anticoagulation along with AVNRT ablation who is doing very well.  He presents back in follow-up.  He denies any chest pain or dyspnea on exertion.  He is staying active and playing golf as well.  He continues on low-dose metoprolol tartrate.  He denies any recurrent episodes of SVT or palpitations.      Past Medical History:   Diagnosis Date   • Anemia    • Anxiety    • Arthritis    • Cancer (HCC)     skin cancer   • Chest pain    • Deep vein thrombosis (DVT) (HCC)     Left arm   • Deep vein thrombosis (HCC)     Right leg separate instance from arm   • History of hepatitis 1960    Pt says type B, but possible type A. Resolved    • History of transfusion    • Hyperlipidemia    • PSVT (paroxysmal supraventricular tachycardia) (HCC)        Past Surgical History:   Procedure Laterality Date   • APPENDECTOMY     • CARDIAC ELECTROPHYSIOLOGY PROCEDURE N/A 2022    Procedure: Ablation SVT;  Surgeon: Markus Conway MD;  Location: [facility] CATH INVASIVE LOCATION;  Service: Cardiovascular;  Laterality: N/A;   • COLONOSCOPY  approx     negative per pt    • COLONOSCOPY N/A 2020    Procedure: COLONOSCOPY to cecum and TI with hot snare polypectomy;  Surgeon: Zach Lerner MD;  Location: [facility] ENDOSCOPY;  Service: Gastroenterology;  Laterality: N/A;  pre: occult blood in stool  Post: polyp, hemorrhoids   • EYE SURGERY Bilateral     Cataracks   • SKIN BIOPSY     • SKIN BIOPSY     • TONSILLECTOMY         Social History     Socioeconomic History   • Marital status:    Tobacco Use   • Smoking status: Former     Packs/day: 0.25     Years:  10.00     Pack years: 2.50     Types: Cigars, Cigarettes     Quit date: 1960     Years since quittin.5   • Smokeless tobacco: Never   Vaping Use   • Vaping Use: Never used   Substance and Sexual Activity   • Alcohol use: Yes     Comment: now 1-3 glasses of wine per day; caffeine use   • Drug use: No   • Sexual activity: Yes       Family History   Problem Relation Age of Onset   • Arthritis Mother    • Cancer Mother    • Alcohol abuse Brother    • Depression Brother    • Diabetes Brother    • Hypertension Brother    • Prostate cancer Brother    • Heart failure Father    • Diabetes Father        Review of Systems   Constitutional: Negative for chills, fever and malaise/fatigue.   Cardiovascular: Negative for chest pain, dyspnea on exertion, leg swelling, near-syncope, orthopnea, palpitations, paroxysmal nocturnal dyspnea and syncope.   Respiratory: Negative for cough and shortness of breath.    Musculoskeletal: Negative for joint pain, joint swelling and myalgias.   Gastrointestinal: Negative for abdominal pain, diarrhea, melena, nausea and vomiting.   Genitourinary: Negative for frequency and hematuria.   Neurological: Negative for light-headedness, numbness, paresthesias and seizures.   Allergic/Immunologic: Negative.    All other systems reviewed and are negative.      No Known Allergies      Current Outpatient Medications:   •  esomeprazole (NexIUM) 20 MG capsule, Take 20 mg by mouth every morning before breakfast., Disp: , Rfl:   •  metoprolol tartrate (LOPRESSOR) 25 MG tablet, Take 25 mg by mouth 2 (Two) Times a Day., Disp: , Rfl:   •  Multiple Vitamins-Minerals (CENTRUM SILVER ADULT 50+ PO), Take 1 tablet by mouth daily., Disp: , Rfl:   •  sertraline (ZOLOFT) 25 MG tablet, Take 25 mg by mouth daily., Disp: , Rfl:   •  simvastatin (ZOCOR) 10 MG tablet, Take 10 mg by mouth every night., Disp: , Rfl:   •  vitamin B-12 (CYANOCOBALAMIN) 100 MCG tablet, Take 50 mcg by mouth Daily., Disp: , Rfl:   •  vitamin  "C (ASCORBIC ACID) 250 MG tablet, Take 250 mg by mouth Daily., Disp: , Rfl:   •  VITAMIN D PO, Take 25 mcg by mouth Daily., Disp: , Rfl:   •  XARELTO 10 MG tablet, Take 10 mg by mouth Daily., Disp: , Rfl:       Objective:     Vitals:    11/10/22 1121   BP: 130/76   Pulse: 66   Weight: 89.8 kg (198 lb)   Height: 182.9 cm (72\")     Body mass index is 26.85 kg/m².    PHYSICAL EXAM:    Vitals Reviewed.   General Appearance: No acute distress, well developed and well nourished.   Eyes: Conjunctiva and lids: No erythema, swelling, or discharge. Sclera non-icteric.   HENT: Atraumatic, normocephalic. External eyes, ears, and nose normal.   Respiratory: No signs of respiratory distress. Respiration rhythm and depth normal.   Clear to auscultation. No rales, crackles, rhonchi, or wheezing auscultated.   Cardiovascular:  Heart Rate and Rhythm: Normal, Heart Sounds: Normal S1 and S2. No S3 or S4 noted.  Murmurs: No murmurs noted. No rubs, thrills, or gallops.   Arterial Pulses:  Posterior tibialis and dorsalis pedis pulses normal.   Lower Extremities: No edema noted.  Gastrointestinal:  Abdomen soft, non-distended, non-tender.   Musculoskeletal: Normal movement of extremities  Skin: Warm and dry.   Psychiatric: Patient alert and oriented to person, place, and time. Speech and behavior appropriate. Normal mood and affect.       ECG 12 Lead    Date/Time: 11/10/2022 12:04 PM  Performed by: Keyshawn Hardy MD  Authorized by: Keyshawn Hardy MD   Comparison: compared with previous ECG from 3/1/2022  Similar to previous ECG  Rhythm: sinus rhythm  Rate: normal  QRS axis: left                   Assessment:      No diagnosis found.       Plan:       1.  SVT, status post successful ablation.  No recurrence.  Continue low-dose metoprolol tartrate.  He will call us back if he has any recurrence of palpitations or tachycardia.     2.  Atypical chest pain, ER/spittle work-up negative he had an echo, stress test and CT of the " chest which ruled out PE.  He has not had any recurrence.    Plan on having him come back to see me in and follow-up as needed.

## 2023-10-31 ENCOUNTER — LAB (OUTPATIENT)
Dept: LAB | Facility: HOSPITAL | Age: 83
End: 2023-10-31
Payer: MEDICARE

## 2023-10-31 ENCOUNTER — TRANSCRIBE ORDERS (OUTPATIENT)
Dept: ADMINISTRATIVE | Facility: HOSPITAL | Age: 83
End: 2023-10-31
Payer: MEDICARE

## 2023-10-31 DIAGNOSIS — D64.9 ANEMIA, UNSPECIFIED TYPE: Primary | ICD-10-CM

## 2023-10-31 DIAGNOSIS — D64.9 ANEMIA, UNSPECIFIED TYPE: ICD-10-CM

## 2023-10-31 LAB
ALBUMIN SERPL-MCNC: 4.1 G/DL (ref 3.5–5.2)
ALBUMIN/GLOB SERPL: 1.8 G/DL
ALP SERPL-CCNC: 53 U/L (ref 39–117)
ALT SERPL W P-5'-P-CCNC: 16 U/L (ref 1–41)
ANION GAP SERPL CALCULATED.3IONS-SCNC: 8 MMOL/L (ref 5–15)
AST SERPL-CCNC: 19 U/L (ref 1–40)
BASOPHILS # BLD AUTO: 0.05 10*3/MM3 (ref 0–0.2)
BASOPHILS NFR BLD AUTO: 0.9 % (ref 0–1.5)
BILIRUB SERPL-MCNC: 0.3 MG/DL (ref 0–1.2)
BUN SERPL-MCNC: 22 MG/DL (ref 8–23)
BUN/CREAT SERPL: 19.3 (ref 7–25)
CALCIUM SPEC-SCNC: 9.5 MG/DL (ref 8.6–10.5)
CHLORIDE SERPL-SCNC: 109 MMOL/L (ref 98–107)
CO2 SERPL-SCNC: 25 MMOL/L (ref 22–29)
CREAT SERPL-MCNC: 1.14 MG/DL (ref 0.76–1.27)
DEPRECATED RDW RBC AUTO: 48.7 FL (ref 37–54)
EGFRCR SERPLBLD CKD-EPI 2021: 63 ML/MIN/1.73
EOSINOPHIL # BLD AUTO: 0.08 10*3/MM3 (ref 0–0.4)
EOSINOPHIL NFR BLD AUTO: 1.5 % (ref 0.3–6.2)
ERYTHROCYTE [DISTWIDTH] IN BLOOD BY AUTOMATED COUNT: 13.5 % (ref 12.3–15.4)
GLOBULIN UR ELPH-MCNC: 2.3 GM/DL
GLUCOSE SERPL-MCNC: 103 MG/DL (ref 65–99)
HCT VFR BLD AUTO: 39.3 % (ref 37.5–51)
HGB BLD-MCNC: 13.8 G/DL (ref 13–17.7)
IMM GRANULOCYTES # BLD AUTO: 0.01 10*3/MM3 (ref 0–0.05)
IMM GRANULOCYTES NFR BLD AUTO: 0.2 % (ref 0–0.5)
LYMPHOCYTES # BLD AUTO: 1.59 10*3/MM3 (ref 0.7–3.1)
LYMPHOCYTES NFR BLD AUTO: 28.9 % (ref 19.6–45.3)
MCH RBC QN AUTO: 34.6 PG (ref 26.6–33)
MCHC RBC AUTO-ENTMCNC: 35.1 G/DL (ref 31.5–35.7)
MCV RBC AUTO: 98.5 FL (ref 79–97)
MONOCYTES # BLD AUTO: 0.56 10*3/MM3 (ref 0.1–0.9)
MONOCYTES NFR BLD AUTO: 10.2 % (ref 5–12)
NEUTROPHILS NFR BLD AUTO: 3.21 10*3/MM3 (ref 1.7–7)
NEUTROPHILS NFR BLD AUTO: 58.3 % (ref 42.7–76)
NRBC BLD AUTO-RTO: 0 /100 WBC (ref 0–0.2)
PLATELET # BLD AUTO: 166 10*3/MM3 (ref 140–450)
PMV BLD AUTO: 10.4 FL (ref 6–12)
POTASSIUM SERPL-SCNC: 4.4 MMOL/L (ref 3.5–5.2)
PROT SERPL-MCNC: 6.4 G/DL (ref 6–8.5)
RBC # BLD AUTO: 3.99 10*6/MM3 (ref 4.14–5.8)
SODIUM SERPL-SCNC: 142 MMOL/L (ref 136–145)
WBC NRBC COR # BLD: 5.5 10*3/MM3 (ref 3.4–10.8)

## 2023-10-31 PROCEDURE — 80053 COMPREHEN METABOLIC PANEL: CPT

## 2023-10-31 PROCEDURE — 85025 COMPLETE CBC W/AUTO DIFF WBC: CPT

## 2023-10-31 PROCEDURE — 36415 COLL VENOUS BLD VENIPUNCTURE: CPT

## 2024-11-08 ENCOUNTER — LAB (OUTPATIENT)
Dept: LAB | Facility: HOSPITAL | Age: 84
End: 2024-11-08
Payer: MEDICARE

## 2024-11-08 ENCOUNTER — TRANSCRIBE ORDERS (OUTPATIENT)
Dept: LAB | Facility: HOSPITAL | Age: 84
End: 2024-11-08
Payer: MEDICARE

## 2024-11-08 DIAGNOSIS — D64.9 ANEMIA, UNSPECIFIED TYPE: ICD-10-CM

## 2024-11-08 DIAGNOSIS — E78.5 HYPERLIPIDEMIA, UNSPECIFIED HYPERLIPIDEMIA TYPE: ICD-10-CM

## 2024-11-08 DIAGNOSIS — E78.5 HYPERLIPIDEMIA, UNSPECIFIED HYPERLIPIDEMIA TYPE: Primary | ICD-10-CM

## 2024-11-08 DIAGNOSIS — Z12.5 SPECIAL SCREENING FOR MALIGNANT NEOPLASM OF PROSTATE: ICD-10-CM

## 2024-11-08 LAB
ALBUMIN SERPL-MCNC: 4.2 G/DL (ref 3.5–5.2)
ALBUMIN/GLOB SERPL: 1.8 G/DL
ALP SERPL-CCNC: 62 U/L (ref 39–117)
ALT SERPL W P-5'-P-CCNC: 15 U/L (ref 1–41)
ANION GAP SERPL CALCULATED.3IONS-SCNC: 11 MMOL/L (ref 5–15)
AST SERPL-CCNC: 18 U/L (ref 1–40)
BASOPHILS # BLD AUTO: 0.06 10*3/MM3 (ref 0–0.2)
BASOPHILS NFR BLD AUTO: 1.1 % (ref 0–1.5)
BILIRUB SERPL-MCNC: 0.4 MG/DL (ref 0–1.2)
BUN SERPL-MCNC: 19 MG/DL (ref 8–23)
BUN/CREAT SERPL: 16.5 (ref 7–25)
CALCIUM SPEC-SCNC: 9.1 MG/DL (ref 8.6–10.5)
CHLORIDE SERPL-SCNC: 104 MMOL/L (ref 98–107)
CHOLEST SERPL-MCNC: 194 MG/DL (ref 0–200)
CO2 SERPL-SCNC: 24 MMOL/L (ref 22–29)
CREAT SERPL-MCNC: 1.15 MG/DL (ref 0.76–1.27)
DEPRECATED RDW RBC AUTO: 49.2 FL (ref 37–54)
EGFRCR SERPLBLD CKD-EPI 2021: 62 ML/MIN/1.73
EOSINOPHIL # BLD AUTO: 0.13 10*3/MM3 (ref 0–0.4)
EOSINOPHIL NFR BLD AUTO: 2.3 % (ref 0.3–6.2)
ERYTHROCYTE [DISTWIDTH] IN BLOOD BY AUTOMATED COUNT: 13.3 % (ref 12.3–15.4)
GLOBULIN UR ELPH-MCNC: 2.3 GM/DL
GLUCOSE SERPL-MCNC: 100 MG/DL (ref 65–99)
HCT VFR BLD AUTO: 40.6 % (ref 37.5–51)
HDLC SERPL-MCNC: 67 MG/DL (ref 40–60)
HGB BLD-MCNC: 13.4 G/DL (ref 13–17.7)
IMM GRANULOCYTES # BLD AUTO: 0.01 10*3/MM3 (ref 0–0.05)
IMM GRANULOCYTES NFR BLD AUTO: 0.2 % (ref 0–0.5)
LDLC SERPL CALC-MCNC: 117 MG/DL (ref 0–100)
LDLC/HDLC SERPL: 1.74 {RATIO}
LYMPHOCYTES # BLD AUTO: 1.6 10*3/MM3 (ref 0.7–3.1)
LYMPHOCYTES NFR BLD AUTO: 28.8 % (ref 19.6–45.3)
MCH RBC QN AUTO: 33.3 PG (ref 26.6–33)
MCHC RBC AUTO-ENTMCNC: 33 G/DL (ref 31.5–35.7)
MCV RBC AUTO: 100.7 FL (ref 79–97)
MONOCYTES # BLD AUTO: 0.6 10*3/MM3 (ref 0.1–0.9)
MONOCYTES NFR BLD AUTO: 10.8 % (ref 5–12)
NEUTROPHILS NFR BLD AUTO: 3.16 10*3/MM3 (ref 1.7–7)
NEUTROPHILS NFR BLD AUTO: 56.8 % (ref 42.7–76)
NRBC BLD AUTO-RTO: 0 /100 WBC (ref 0–0.2)
PLATELET # BLD AUTO: 177 10*3/MM3 (ref 140–450)
PMV BLD AUTO: 10 FL (ref 6–12)
POTASSIUM SERPL-SCNC: 4.4 MMOL/L (ref 3.5–5.2)
PROT SERPL-MCNC: 6.5 G/DL (ref 6–8.5)
PSA SERPL-MCNC: 2.46 NG/ML (ref 0–4)
RBC # BLD AUTO: 4.03 10*6/MM3 (ref 4.14–5.8)
SODIUM SERPL-SCNC: 139 MMOL/L (ref 136–145)
TRIGL SERPL-MCNC: 52 MG/DL (ref 0–150)
VLDLC SERPL-MCNC: 10 MG/DL (ref 5–40)
WBC NRBC COR # BLD AUTO: 5.56 10*3/MM3 (ref 3.4–10.8)

## 2024-11-08 PROCEDURE — 85025 COMPLETE CBC W/AUTO DIFF WBC: CPT

## 2024-11-08 PROCEDURE — 36415 COLL VENOUS BLD VENIPUNCTURE: CPT

## 2024-11-08 PROCEDURE — 80061 LIPID PANEL: CPT

## 2024-11-08 PROCEDURE — G0103 PSA SCREENING: HCPCS

## 2024-11-08 PROCEDURE — 80053 COMPREHEN METABOLIC PANEL: CPT

## (undated) DEVICE — Device: Brand: WEBSTER

## (undated) DEVICE — SENSR O2 OXIMAX FNGR A/ 18IN NONSTR

## (undated) DEVICE — Device: Brand: REFERENCE PATCH CARTO 3

## (undated) DEVICE — LN SMPL CO2 SHTRM SD STREAM W/M LUER

## (undated) DEVICE — TUBING, SUCTION, 1/4" X 10', STRAIGHT: Brand: MEDLINE

## (undated) DEVICE — PINNACLE INTRODUCER SHEATH: Brand: PINNACLE

## (undated) DEVICE — SNAR POLYP SENSATION STDOVL 27 240 BX40

## (undated) DEVICE — Device: Brand: WEBSTER CS

## (undated) DEVICE — CANN O2 ETCO2 FITS ALL CONN CO2 SMPL A/ 7IN DISP LF

## (undated) DEVICE — INTRO HEMO TRIO 12F

## (undated) DEVICE — THE SINGLE USE ETRAP – POLYP TRAP IS USED FOR SUCTION RETRIEVAL OF ENDOSCOPICALLY REMOVED POLYPS.: Brand: ETRAP

## (undated) DEVICE — Device

## (undated) DEVICE — THE TORRENT IRRIGATION SCOPE CONNECTOR IS USED WITH THE TORRENT IRRIGATION TUBING TO PROVIDE IRRIGATION FLUIDS SUCH AS STERILE WATER DURING GASTROINTESTINAL ENDOSCOPIC PROCEDURES WHEN USED IN CONJUNCTION WITH AN IRRIGATION PUMP (OR ELECTROSURGICAL UNIT).: Brand: TORRENT

## (undated) DEVICE — ADAPT CLN BIOGUARD AIR/H2O DISP

## (undated) DEVICE — LOU EP: Brand: MEDLINE INDUSTRIES, INC.

## (undated) DEVICE — Device: Brand: EZ STEER NAV

## (undated) DEVICE — PREF.GUIDING SHEATH W/MULT.CRV: Brand: PREFACE

## (undated) DEVICE — KT ORCA ORCAPOD DISP STRL